# Patient Record
Sex: MALE | Race: WHITE | NOT HISPANIC OR LATINO | URBAN - METROPOLITAN AREA
[De-identification: names, ages, dates, MRNs, and addresses within clinical notes are randomized per-mention and may not be internally consistent; named-entity substitution may affect disease eponyms.]

---

## 2017-07-12 ENCOUNTER — INPATIENT (INPATIENT)
Facility: HOSPITAL | Age: 37
LOS: 1 days | Discharge: HOME | DRG: 730 | End: 2017-07-14
Attending: UROLOGY | Admitting: UROLOGY
Payer: SELF-PAY

## 2017-07-12 ENCOUNTER — EMERGENCY (EMERGENCY)
Facility: HOSPITAL | Age: 37
LOS: 1 days | Discharge: OTHER INSTITUTION | End: 2017-07-12
Attending: EMERGENCY MEDICINE | Admitting: EMERGENCY MEDICINE
Payer: SELF-PAY

## 2017-07-12 VITALS
DIASTOLIC BLOOD PRESSURE: 73 MMHG | RESPIRATION RATE: 18 BRPM | TEMPERATURE: 98 F | HEART RATE: 94 BPM | OXYGEN SATURATION: 99 % | SYSTOLIC BLOOD PRESSURE: 143 MMHG

## 2017-07-12 VITALS
HEART RATE: 72 BPM | RESPIRATION RATE: 16 BRPM | SYSTOLIC BLOOD PRESSURE: 124 MMHG | DIASTOLIC BLOOD PRESSURE: 80 MMHG | TEMPERATURE: 98 F | OXYGEN SATURATION: 94 %

## 2017-07-12 VITALS
SYSTOLIC BLOOD PRESSURE: 122 MMHG | DIASTOLIC BLOOD PRESSURE: 70 MMHG | RESPIRATION RATE: 19 BRPM | OXYGEN SATURATION: 99 % | HEART RATE: 79 BPM

## 2017-07-12 DIAGNOSIS — N48.33 PRIAPISM, DRUG-INDUCED: ICD-10-CM

## 2017-07-12 LAB
ALBUMIN SERPL ELPH-MCNC: 3.6 G/DL — SIGNIFICANT CHANGE UP (ref 3.4–5)
ALP SERPL-CCNC: 89 U/L — SIGNIFICANT CHANGE UP (ref 40–120)
ALT FLD-CCNC: 106 U/L — HIGH (ref 12–42)
ANION GAP SERPL CALC-SCNC: 10 MMOL/L — SIGNIFICANT CHANGE UP (ref 9–16)
APTT BLD: 31.1 SEC — SIGNIFICANT CHANGE UP (ref 27.5–36.5)
AST SERPL-CCNC: 70 U/L — HIGH (ref 15–37)
BASOPHILS NFR BLD AUTO: 0.3 % — SIGNIFICANT CHANGE UP (ref 0–2)
BILIRUB SERPL-MCNC: 1.3 MG/DL — HIGH (ref 0.2–1.2)
BUN SERPL-MCNC: 8 MG/DL — SIGNIFICANT CHANGE UP (ref 7–23)
CALCIUM SERPL-MCNC: 9.1 MG/DL — SIGNIFICANT CHANGE UP (ref 8.5–10.5)
CHLORIDE SERPL-SCNC: 102 MMOL/L — SIGNIFICANT CHANGE UP (ref 96–108)
CO2 SERPL-SCNC: 28 MMOL/L — SIGNIFICANT CHANGE UP (ref 22–31)
CREAT SERPL-MCNC: 0.95 MG/DL — SIGNIFICANT CHANGE UP (ref 0.5–1.3)
EOSINOPHIL NFR BLD AUTO: 3.3 % — SIGNIFICANT CHANGE UP (ref 0–6)
GLUCOSE SERPL-MCNC: 94 MG/DL — SIGNIFICANT CHANGE UP (ref 70–99)
HCT VFR BLD CALC: 38.2 % — LOW (ref 39–50)
HGB BLD-MCNC: 13.4 G/DL — SIGNIFICANT CHANGE UP (ref 13–17)
IMM GRANULOCYTES NFR BLD AUTO: 0.1 % — SIGNIFICANT CHANGE UP (ref 0–1.5)
INR BLD: 1.32 — HIGH (ref 0.88–1.16)
LYMPHOCYTES # BLD AUTO: 36.1 % — SIGNIFICANT CHANGE UP (ref 13–44)
MCHC RBC-ENTMCNC: 29.4 PG — SIGNIFICANT CHANGE UP (ref 27–34)
MCHC RBC-ENTMCNC: 35.1 G/DL — SIGNIFICANT CHANGE UP (ref 32–36)
MCV RBC AUTO: 83.8 FL — SIGNIFICANT CHANGE UP (ref 80–100)
MONOCYTES NFR BLD AUTO: 13 % — SIGNIFICANT CHANGE UP (ref 2–14)
NEUTROPHILS NFR BLD AUTO: 47.2 % — SIGNIFICANT CHANGE UP (ref 43–77)
PLATELET # BLD AUTO: 214 K/UL — SIGNIFICANT CHANGE UP (ref 150–400)
POTASSIUM SERPL-MCNC: 2.8 MMOL/L — CRITICAL LOW (ref 3.5–5.3)
POTASSIUM SERPL-SCNC: 2.8 MMOL/L — CRITICAL LOW (ref 3.5–5.3)
PROT SERPL-MCNC: 7.2 G/DL — SIGNIFICANT CHANGE UP (ref 6.4–8.2)
PROTHROM AB SERPL-ACNC: 14.6 SEC — HIGH (ref 9.8–12.7)
RBC # BLD: 4.56 M/UL — SIGNIFICANT CHANGE UP (ref 4.2–5.8)
RBC # FLD: 13.6 % — SIGNIFICANT CHANGE UP (ref 10.3–16.9)
SODIUM SERPL-SCNC: 140 MMOL/L — SIGNIFICANT CHANGE UP (ref 132–145)
WBC # BLD: 6.9 K/UL — SIGNIFICANT CHANGE UP (ref 3.8–10.5)
WBC # FLD AUTO: 6.9 K/UL — SIGNIFICANT CHANGE UP (ref 3.8–10.5)

## 2017-07-12 PROCEDURE — 99285 EMERGENCY DEPT VISIT HI MDM: CPT | Mod: 25

## 2017-07-12 PROCEDURE — 99285 EMERGENCY DEPT VISIT HI MDM: CPT

## 2017-07-12 RX ORDER — MORPHINE SULFATE 50 MG/1
4 CAPSULE, EXTENDED RELEASE ORAL ONCE
Qty: 0 | Refills: 0 | Status: DISCONTINUED | OUTPATIENT
Start: 2017-07-12 | End: 2017-07-12

## 2017-07-12 RX ORDER — SODIUM CHLORIDE 9 MG/ML
1000 INJECTION INTRAMUSCULAR; INTRAVENOUS; SUBCUTANEOUS ONCE
Qty: 0 | Refills: 0 | Status: COMPLETED | OUTPATIENT
Start: 2017-07-12 | End: 2017-07-12

## 2017-07-12 RX ORDER — POTASSIUM CHLORIDE 20 MEQ
40 PACKET (EA) ORAL ONCE
Qty: 0 | Refills: 0 | Status: COMPLETED | OUTPATIENT
Start: 2017-07-12 | End: 2017-07-12

## 2017-07-12 RX ADMIN — MORPHINE SULFATE 4 MILLIGRAM(S): 50 CAPSULE, EXTENDED RELEASE ORAL at 20:40

## 2017-07-12 RX ADMIN — MORPHINE SULFATE 4 MILLIGRAM(S): 50 CAPSULE, EXTENDED RELEASE ORAL at 20:10

## 2017-07-12 RX ADMIN — SODIUM CHLORIDE 1000 MILLILITER(S): 9 INJECTION INTRAMUSCULAR; INTRAVENOUS; SUBCUTANEOUS at 22:43

## 2017-07-12 RX ADMIN — Medication 40 MILLIEQUIVALENT(S): at 22:55

## 2017-07-12 RX ADMIN — MORPHINE SULFATE 4 MILLIGRAM(S): 50 CAPSULE, EXTENDED RELEASE ORAL at 22:44

## 2017-07-12 NOTE — ED PROVIDER NOTE - ATTENDING CONTRIBUTION TO CARE
36 yom pw priapism >24 hr (started last night 8pm), after injecting trimix.  also reported using crystal meth.  no other substances.  pt reported partial tumescence since but never full.     agree w/ PA, avss, tumescent penis noted, no bleeding, also small area of mildly erythematous and indurated area to R forearm, c/w injection, pt denies any broken needle, rec'd warm compress and topical abx and follow up, no lymphatic streaking, can consider oral abx if not improvement w/ aforementioned management

## 2017-07-12 NOTE — ED ADULT TRIAGE NOTE - CHIEF COMPLAINT QUOTE
priapism x 18 hrs. Pt is transfer from [facility]. Got a total of 8 mg of morphine

## 2017-07-12 NOTE — ED PROVIDER NOTE - OBJECTIVE STATEMENT
36 y.o. male with priapism approx 8-10 hours after using TRIMIX and IV/IM crystal meth, with c/o persistent priapism. Attempted drainage in ED but with no improvement, pt accepted to [facility] ED by DR CRISTINA, notified urology SCOTT MOON.

## 2017-07-12 NOTE — ED PROVIDER NOTE - MEDICAL DECISION MAKING DETAILS
appreciate [facility] chart and urology with patient upon arrival for care and treatment

## 2017-07-12 NOTE — ED PROVIDER NOTE - OBJECTIVE STATEMENT
xfer to [facility] from [facility] for urological care for persistent priapism medication induced and illicit drug related as well + priapism > 24 hours by this time

## 2017-07-12 NOTE — ED PROVIDER NOTE - CHIEF COMPLAINT
The patient is a 36y Male complaining of penile discharge. The patient is a 36y Male complaining of priapism

## 2017-07-12 NOTE — ED PROVIDER NOTE - MEDICAL DECISION MAKING DETAILS
unable to reduced priapism in ED, accepted for transfer to [facility] ED for urology eval

## 2017-07-13 DIAGNOSIS — N48.30 PRIAPISM, UNSPECIFIED: ICD-10-CM

## 2017-07-13 LAB
AMPHET UR-MCNC: POSITIVE
ANION GAP SERPL CALC-SCNC: 10 MMOL/L — SIGNIFICANT CHANGE UP (ref 5–17)
ANION GAP SERPL CALC-SCNC: 15 MMOL/L — SIGNIFICANT CHANGE UP (ref 5–17)
APPEARANCE UR: CLEAR — SIGNIFICANT CHANGE UP
BACTERIA # UR AUTO: PRESENT /HPF
BARBITURATES UR SCN-MCNC: NEGATIVE — SIGNIFICANT CHANGE UP
BASE EXCESS BLDA CALC-SCNC: -16 MMOL/L — LOW (ref -2–3)
BENZODIAZ UR-MCNC: POSITIVE
BILIRUB UR-MCNC: NEGATIVE — SIGNIFICANT CHANGE UP
BUN SERPL-MCNC: 6 MG/DL — LOW (ref 7–23)
BUN SERPL-MCNC: 6 MG/DL — LOW (ref 7–23)
CALCIUM SERPL-MCNC: 8.2 MG/DL — LOW (ref 8.4–10.5)
CALCIUM SERPL-MCNC: 8.4 MG/DL — SIGNIFICANT CHANGE UP (ref 8.4–10.5)
CHLORIDE SERPL-SCNC: 102 MMOL/L — SIGNIFICANT CHANGE UP (ref 96–108)
CHLORIDE SERPL-SCNC: 99 MMOL/L — SIGNIFICANT CHANGE UP (ref 96–108)
CO2 SERPL-SCNC: 26 MMOL/L — SIGNIFICANT CHANGE UP (ref 22–31)
CO2 SERPL-SCNC: 26 MMOL/L — SIGNIFICANT CHANGE UP (ref 22–31)
COCAINE METAB.OTHER UR-MCNC: NEGATIVE — SIGNIFICANT CHANGE UP
COLOR SPEC: YELLOW — SIGNIFICANT CHANGE UP
CREAT SERPL-MCNC: 0.7 MG/DL — SIGNIFICANT CHANGE UP (ref 0.5–1.3)
CREAT SERPL-MCNC: 0.9 MG/DL — SIGNIFICANT CHANGE UP (ref 0.5–1.3)
DIFF PNL FLD: (no result)
EPI CELLS # UR: SIGNIFICANT CHANGE UP /HPF
GAS PNL BLDA: SIGNIFICANT CHANGE UP
GLUCOSE SERPL-MCNC: 85 MG/DL — SIGNIFICANT CHANGE UP (ref 70–99)
GLUCOSE SERPL-MCNC: 95 MG/DL — SIGNIFICANT CHANGE UP (ref 70–99)
GLUCOSE UR QL: NEGATIVE — SIGNIFICANT CHANGE UP
HCO3 BLDA-SCNC: 20 MMOL/L — LOW (ref 21–28)
HCT VFR BLD CALC: 37.6 % — LOW (ref 39–50)
HGB BLD-MCNC: 12.6 G/DL — LOW (ref 13–17)
KETONES UR-MCNC: 40 MG/DL
LEUKOCYTE ESTERASE UR-ACNC: (no result)
MAGNESIUM SERPL-MCNC: 2.2 MG/DL — SIGNIFICANT CHANGE UP (ref 1.6–2.6)
MCHC RBC-ENTMCNC: 28.4 PG — SIGNIFICANT CHANGE UP (ref 27–34)
MCHC RBC-ENTMCNC: 33.5 G/DL — SIGNIFICANT CHANGE UP (ref 32–36)
MCV RBC AUTO: 84.9 FL — SIGNIFICANT CHANGE UP (ref 80–100)
METHADONE UR-MCNC: NEGATIVE — SIGNIFICANT CHANGE UP
NITRITE UR-MCNC: POSITIVE
OPIATES UR-MCNC: POSITIVE
PCO2 BLDA: 105 MMHG — CRITICAL HIGH (ref 35–48)
PCP SPEC-MCNC: SIGNIFICANT CHANGE UP
PCP UR-MCNC: NEGATIVE — SIGNIFICANT CHANGE UP
PH BLDA: 6.91 — CRITICAL LOW (ref 7.35–7.45)
PH UR: 6.5 — SIGNIFICANT CHANGE UP (ref 5–8)
PHOSPHATE SERPL-MCNC: 3.4 MG/DL — SIGNIFICANT CHANGE UP (ref 2.5–4.5)
PLATELET # BLD AUTO: 193 K/UL — SIGNIFICANT CHANGE UP (ref 150–400)
PO2 BLDA: 12 MMHG — CRITICAL LOW (ref 83–108)
POTASSIUM SERPL-MCNC: 3.1 MMOL/L — LOW (ref 3.5–5.3)
POTASSIUM SERPL-MCNC: 4.1 MMOL/L — SIGNIFICANT CHANGE UP (ref 3.5–5.3)
POTASSIUM SERPL-SCNC: 3.1 MMOL/L — LOW (ref 3.5–5.3)
POTASSIUM SERPL-SCNC: 4.1 MMOL/L — SIGNIFICANT CHANGE UP (ref 3.5–5.3)
PROT UR-MCNC: NEGATIVE MG/DL — SIGNIFICANT CHANGE UP
RBC # BLD: 4.43 M/UL — SIGNIFICANT CHANGE UP (ref 4.2–5.8)
RBC # FLD: 14.1 % — SIGNIFICANT CHANGE UP (ref 10.3–16.9)
RBC CASTS # UR COMP ASSIST: (no result) /HPF
SAO2 % BLDA: SIGNIFICANT CHANGE UP % (ref 95–100)
SODIUM SERPL-SCNC: 138 MMOL/L — SIGNIFICANT CHANGE UP (ref 135–145)
SODIUM SERPL-SCNC: 140 MMOL/L — SIGNIFICANT CHANGE UP (ref 135–145)
SP GR SPEC: 1.01 — SIGNIFICANT CHANGE UP (ref 1–1.03)
THC UR QL: NEGATIVE — SIGNIFICANT CHANGE UP
UROBILINOGEN FLD QL: 0.2 E.U./DL — SIGNIFICANT CHANGE UP
WBC # BLD: 6.9 K/UL — SIGNIFICANT CHANGE UP (ref 3.8–10.5)
WBC # FLD AUTO: 6.9 K/UL — SIGNIFICANT CHANGE UP (ref 3.8–10.5)
WBC UR QL: (no result) /HPF

## 2017-07-13 PROCEDURE — 54435 REVISION OF PENIS: CPT

## 2017-07-13 PROCEDURE — 99223 1ST HOSP IP/OBS HIGH 75: CPT | Mod: 57

## 2017-07-13 RX ORDER — MORPHINE SULFATE 50 MG/1
4 CAPSULE, EXTENDED RELEASE ORAL EVERY 4 HOURS
Qty: 0 | Refills: 0 | Status: DISCONTINUED | OUTPATIENT
Start: 2017-07-13 | End: 2017-07-14

## 2017-07-13 RX ORDER — OXYCODONE AND ACETAMINOPHEN 5; 325 MG/1; MG/1
2 TABLET ORAL EVERY 4 HOURS
Qty: 0 | Refills: 0 | Status: DISCONTINUED | OUTPATIENT
Start: 2017-07-13 | End: 2017-07-13

## 2017-07-13 RX ORDER — ONDANSETRON 8 MG/1
4 TABLET, FILM COATED ORAL EVERY 8 HOURS
Qty: 0 | Refills: 0 | Status: DISCONTINUED | OUTPATIENT
Start: 2017-07-13 | End: 2017-07-14

## 2017-07-13 RX ORDER — ACETAMINOPHEN 500 MG
650 TABLET ORAL EVERY 6 HOURS
Qty: 0 | Refills: 0 | Status: DISCONTINUED | OUTPATIENT
Start: 2017-07-13 | End: 2017-07-14

## 2017-07-13 RX ORDER — DOCUSATE SODIUM 100 MG
100 CAPSULE ORAL
Qty: 0 | Refills: 0 | Status: DISCONTINUED | OUTPATIENT
Start: 2017-07-13 | End: 2017-07-14

## 2017-07-13 RX ORDER — OXYCODONE AND ACETAMINOPHEN 5; 325 MG/1; MG/1
2 TABLET ORAL EVERY 4 HOURS
Qty: 0 | Refills: 0 | Status: DISCONTINUED | OUTPATIENT
Start: 2017-07-13 | End: 2017-07-14

## 2017-07-13 RX ORDER — ACETAMINOPHEN 500 MG
650 TABLET ORAL EVERY 6 HOURS
Qty: 0 | Refills: 0 | Status: DISCONTINUED | OUTPATIENT
Start: 2017-07-13 | End: 2017-07-13

## 2017-07-13 RX ORDER — ONDANSETRON 8 MG/1
4 TABLET, FILM COATED ORAL EVERY 8 HOURS
Qty: 0 | Refills: 0 | Status: DISCONTINUED | OUTPATIENT
Start: 2017-07-13 | End: 2017-07-13

## 2017-07-13 RX ORDER — CEFAZOLIN SODIUM 1 G
1000 VIAL (EA) INJECTION ONCE
Qty: 0 | Refills: 0 | Status: COMPLETED | OUTPATIENT
Start: 2017-07-13 | End: 2017-07-13

## 2017-07-13 RX ORDER — SODIUM CHLORIDE 9 MG/ML
1000 INJECTION INTRAMUSCULAR; INTRAVENOUS; SUBCUTANEOUS
Qty: 0 | Refills: 0 | Status: DISCONTINUED | OUTPATIENT
Start: 2017-07-13 | End: 2017-07-13

## 2017-07-13 RX ORDER — OXYCODONE AND ACETAMINOPHEN 5; 325 MG/1; MG/1
1 TABLET ORAL EVERY 4 HOURS
Qty: 0 | Refills: 0 | Status: DISCONTINUED | OUTPATIENT
Start: 2017-07-13 | End: 2017-07-14

## 2017-07-13 RX ORDER — SODIUM CHLORIDE 9 MG/ML
1000 INJECTION, SOLUTION INTRAVENOUS
Qty: 0 | Refills: 0 | Status: DISCONTINUED | OUTPATIENT
Start: 2017-07-13 | End: 2017-07-13

## 2017-07-13 RX ORDER — DOCUSATE SODIUM 100 MG
100 CAPSULE ORAL
Qty: 0 | Refills: 0 | Status: DISCONTINUED | OUTPATIENT
Start: 2017-07-13 | End: 2017-07-13

## 2017-07-13 RX ORDER — POTASSIUM CHLORIDE 20 MEQ
10 PACKET (EA) ORAL
Qty: 0 | Refills: 0 | Status: DISCONTINUED | OUTPATIENT
Start: 2017-07-13 | End: 2017-07-13

## 2017-07-13 RX ORDER — MORPHINE SULFATE 50 MG/1
4 CAPSULE, EXTENDED RELEASE ORAL EVERY 4 HOURS
Qty: 0 | Refills: 0 | Status: DISCONTINUED | OUTPATIENT
Start: 2017-07-13 | End: 2017-07-13

## 2017-07-13 RX ORDER — OXYCODONE AND ACETAMINOPHEN 5; 325 MG/1; MG/1
1 TABLET ORAL EVERY 4 HOURS
Qty: 0 | Refills: 0 | Status: DISCONTINUED | OUTPATIENT
Start: 2017-07-13 | End: 2017-07-13

## 2017-07-13 RX ORDER — CEFAZOLIN SODIUM 1 G
1000 VIAL (EA) INJECTION EVERY 8 HOURS
Qty: 0 | Refills: 0 | Status: DISCONTINUED | OUTPATIENT
Start: 2017-07-13 | End: 2017-07-13

## 2017-07-13 RX ORDER — SODIUM CHLORIDE 9 MG/ML
1000 INJECTION, SOLUTION INTRAVENOUS
Qty: 0 | Refills: 0 | Status: DISCONTINUED | OUTPATIENT
Start: 2017-07-13 | End: 2017-07-14

## 2017-07-13 RX ADMIN — OXYCODONE AND ACETAMINOPHEN 2 TABLET(S): 5; 325 TABLET ORAL at 20:44

## 2017-07-13 RX ADMIN — MORPHINE SULFATE 4 MILLIGRAM(S): 50 CAPSULE, EXTENDED RELEASE ORAL at 04:55

## 2017-07-13 RX ADMIN — OXYCODONE AND ACETAMINOPHEN 2 TABLET(S): 5; 325 TABLET ORAL at 03:43

## 2017-07-13 RX ADMIN — MORPHINE SULFATE 4 MILLIGRAM(S): 50 CAPSULE, EXTENDED RELEASE ORAL at 14:32

## 2017-07-13 RX ADMIN — Medication 100 MILLIEQUIVALENT(S): at 10:23

## 2017-07-13 RX ADMIN — Medication 1 TABLET(S): at 18:01

## 2017-07-13 RX ADMIN — Medication 100 MILLIGRAM(S): at 18:01

## 2017-07-13 RX ADMIN — OXYCODONE AND ACETAMINOPHEN 2 TABLET(S): 5; 325 TABLET ORAL at 04:43

## 2017-07-13 RX ADMIN — OXYCODONE AND ACETAMINOPHEN 2 TABLET(S): 5; 325 TABLET ORAL at 21:44

## 2017-07-13 RX ADMIN — MORPHINE SULFATE 4 MILLIGRAM(S): 50 CAPSULE, EXTENDED RELEASE ORAL at 14:17

## 2017-07-13 RX ADMIN — Medication 100 MILLIGRAM(S): at 02:05

## 2017-07-13 NOTE — PROGRESS NOTE ADULT - SUBJECTIVE AND OBJECTIVE BOX
Postop:  Pt denies chest pain, sob, nv or severe abdominal pain             Vital Signs Last 24 Hrs  T(C): 36.3 (13 Jul 2017 14:11), Max: 36.9 (12 Jul 2017 23:37)  T(F): 97.4 (13 Jul 2017 14:11), Max: 98.5 (13 Jul 2017 02:54)  HR: 61 (13 Jul 2017 14:11) (56 - 94)  BP: 134/83 (13 Jul 2017 14:11) (121/81 - 144/79)  BP(mean): --  RR: 16 (13 Jul 2017 14:11) (16 - 19)  SpO2: 98% (13 Jul 2017 14:11) (94% - 100%)    I&O's Summary    12 Jul 2017 07:01  -  13 Jul 2017 07:00  --------------------------------------------------------  IN: 300 mL / OUT: 0 mL / NET: 300 mL    13 Jul 2017 07:01  -  13 Jul 2017 14:59  --------------------------------------------------------  IN: 175 mL / OUT: 1600 mL / NET: -1425 mL        Gen: NAD    Abd: non-distended, TTP in suprapubic area    : jose taped up draining clear, blood at meatus with saturated dressing                           12.6   6.9   )-----------( 193      ( 13 Jul 2017 08:50 )             37.6     07-13    138  |  102  |  6<L>  ----------------------------<  85  3.1<L>   |  26  |  0.90    Ca    8.2<L>      13 Jul 2017 08:51  Phos  3.4     07-13  Mg     2.2     07-13    TPro  7.2  /  Alb  3.6  /  TBili  1.3<H>  /  DBili  x   /  AST  70<H>  /  ALT  106<H>  /  AlkPhos  89  07-12    cultures    A/P: 36 hx priapism s/p corpora cavernosa shunt 7/13  1- jose- monitor uop. Jose to remain for 6 weeks  2- bactrim  3- regular diet  4- pain meds prn  5- dc home tomorrow with bactrim for 2 weeks

## 2017-07-13 NOTE — H&P ADULT - NSHPLABSRESULTS_GEN_ALL_CORE
CBC Full  -  ( 12 Jul 2017 22:25 )  WBC Count : 6.9 K/uL  Hemoglobin : 13.4 g/dL  Hematocrit : 38.2 %  Platelet Count - Automated : 214 K/uL  Mean Cell Volume : 83.8 fL  Mean Cell Hemoglobin : 29.4 pg  Mean Cell Hemoglobin Concentration : 35.1 g/dL  Auto Neutrophil # : x  Auto Lymphocyte # : x  Auto Monocyte # : x  Auto Eosinophil # : x  Auto Basophil # : x  Auto Neutrophil % : 47.2 %  Auto Lymphocyte % : 36.1 %  Auto Monocyte % : 13.0 %  Auto Eosinophil % : 3.3 %  Auto Basophil % : 0.3 %  07-12    140  |  102  |  8   ----------------------------<  94  2.8<LL>   |  28  |  0.95    Ca    9.1      12 Jul 2017 22:25    TPro  7.2  /  Alb  3.6  /  TBili  1.3<H>  /  DBili  x   /  AST  70<H>  /  ALT  106<H>  /  AlkPhos  89  07-12

## 2017-07-13 NOTE — PROGRESS NOTE ADULT - SUBJECTIVE AND OBJECTIVE BOX
INTERVAL HPI/OVERNIGHT EVENTS:  admitted overnight for priapism. c/o discomfort penis.     VITALS:    T(F): 97.3 (07-13-17 @ 05:08), Max: 98.5 (07-13-17 @ 02:54)  HR: 74 (07-13-17 @ 05:08) (70 - 94)  BP: 141/69 (07-13-17 @ 05:08) (121/81 - 144/79)  RR: 17 (07-13-17 @ 05:08) (16 - 19)  SpO2: 95% (07-13-17 @ 05:08) (94% - 100%)  Wt(kg): --    I&O's Detail      MEDICATIONS:    ANTIBIOTICS:  ceFAZolin   IVPB 1000 milliGRAM(s) IV Intermittent every 8 hours      PAIN CONTROL:  ondansetron Injectable 4 milliGRAM(s) IV Push every 8 hours PRN  acetaminophen   Tablet. 650 milliGRAM(s) Oral every 6 hours PRN  oxyCODONE    5 mG/acetaminophen 325 mG 1 Tablet(s) Oral every 4 hours PRN  oxyCODONE    5 mG/acetaminophen 325 mG 2 Tablet(s) Oral every 4 hours PRN  morphine  - Injectable 4 milliGRAM(s) IV Push every 4 hours PRN       MEDS:      HEME/ONC        PHYSICAL EXAM:  General: No acute distress.  Alert and Oriented  Abdominal Exam: soft, NT, ND   Exam: +priapism      LABS:                        13.4   6.9   )-----------( 214      ( 12 Jul 2017 22:25 )             38.2     07-12    140  |  102  |  8   ----------------------------<  94  2.8<LL>   |  28  |  0.95    Ca    9.1      12 Jul 2017 22:25    TPro  7.2  /  Alb  3.6  /  TBili  1.3<H>  /  DBili  x   /  AST  70<H>  /  ALT  106<H>  /  AlkPhos  89  07-12    PT/INR - ( 12 Jul 2017 22:30 )   PT: 14.6 sec;   INR: 1.32          PTT - ( 12 Jul 2017 22:25 )  PTT:31.1 sec      RADIOLOGY & ADDITIONAL TESTS:

## 2017-07-13 NOTE — H&P ADULT - ATTENDING COMMENTS
seen and examined  extensive conversation with patient  pt reports erection for over 36 hours and unclear time of injection of trimix as medication was injected while high on crystal meth per his report. hx IV drug use. 10/10 erection was persistent but patient reports was unable to come in for care for a variety of reasons.  on exam  10/10 erection  erythema over right forearm which patient reports is due to IV drug injection    spoke with patient regarding prognosis given prolonged erection. failed intracavernous irrigation and phenylephrine injections. understands very high likelihood of permanent erectile dysfunction both as a result of priapism as well as possible cavernosal spongiosal shunt surgery. He understands permanent ED will likely mean failure of PDE5 inhibitors and intracavernous injection therapies, although he may be a candidate for future prosthesis surgery or JAM use. I am hesitant to consider up front implant surgery here due to unclear ability for follow up and unclear adherence, possibly leading to increased infection risk. Shunt risks including permanent ED, penile deformity and urethral injury requiring prolonged catheterization also discussed.     Patient is unsure as to how to best proceed and has become quite emotional over the prognosis from his prolonged priapism. I spent an additional 15 minutes addressing all of his questions but he remains unable to make a decision as to proceeding. He understands that he can choose to avoid surgery at this time and allow the priapism to "burn out" over the course of a few weeks, which will lead to near certain permanent ED.     He does not want to proceed at this time. Option for surgery remains for patient. Will follow up.

## 2017-07-13 NOTE — H&P ADULT - HISTORY OF PRESENT ILLNESS
37 yo male with h/o illicit drug use presented to [place] ER tonight c/o prolonged erection since last night 8pm. Patient was taking illicit drugs and injected trimix. Attempt in [place] ER to reduce priapism unsuccessful and pt transferred to [facility] ER.  Patient c/o discomfort penis. No CP/SOB. No Fever/chills. No penile d/c. Voiding ok.

## 2017-07-13 NOTE — ED ADULT NURSE NOTE - OBJECTIVE STATEMENT
Pt sent from [facility] for unresolved priapism > 18 hours despite draining.

## 2017-07-14 ENCOUNTER — TRANSCRIPTION ENCOUNTER (OUTPATIENT)
Age: 37
End: 2017-07-14

## 2017-07-14 VITALS
RESPIRATION RATE: 18 BRPM | TEMPERATURE: 97 F | HEART RATE: 90 BPM | DIASTOLIC BLOOD PRESSURE: 60 MMHG | OXYGEN SATURATION: 99 % | SYSTOLIC BLOOD PRESSURE: 105 MMHG

## 2017-07-14 LAB
ANION GAP SERPL CALC-SCNC: 12 MMOL/L — SIGNIFICANT CHANGE UP (ref 5–17)
BASOPHILS NFR BLD AUTO: 0.1 % — SIGNIFICANT CHANGE UP (ref 0–2)
BUN SERPL-MCNC: 9 MG/DL — SIGNIFICANT CHANGE UP (ref 7–23)
CALCIUM SERPL-MCNC: 8.5 MG/DL — SIGNIFICANT CHANGE UP (ref 8.4–10.5)
CHLORIDE SERPL-SCNC: 98 MMOL/L — SIGNIFICANT CHANGE UP (ref 96–108)
CO2 SERPL-SCNC: 27 MMOL/L — SIGNIFICANT CHANGE UP (ref 22–31)
CREAT SERPL-MCNC: 0.8 MG/DL — SIGNIFICANT CHANGE UP (ref 0.5–1.3)
EOSINOPHIL NFR BLD AUTO: 0.1 % — SIGNIFICANT CHANGE UP (ref 0–6)
GLUCOSE SERPL-MCNC: 104 MG/DL — HIGH (ref 70–99)
HCT VFR BLD CALC: 37.1 % — LOW (ref 39–50)
HGB BLD-MCNC: 12.8 G/DL — LOW (ref 13–17)
LYMPHOCYTES # BLD AUTO: 12.8 % — LOW (ref 13–44)
MCHC RBC-ENTMCNC: 29 PG — SIGNIFICANT CHANGE UP (ref 27–34)
MCHC RBC-ENTMCNC: 34.5 G/DL — SIGNIFICANT CHANGE UP (ref 32–36)
MCV RBC AUTO: 84.1 FL — SIGNIFICANT CHANGE UP (ref 80–100)
MONOCYTES NFR BLD AUTO: 12.5 % — SIGNIFICANT CHANGE UP (ref 2–14)
NEUTROPHILS NFR BLD AUTO: 74.5 % — SIGNIFICANT CHANGE UP (ref 43–77)
PLATELET # BLD AUTO: 205 K/UL — SIGNIFICANT CHANGE UP (ref 150–400)
POTASSIUM SERPL-MCNC: 4.3 MMOL/L — SIGNIFICANT CHANGE UP (ref 3.5–5.3)
POTASSIUM SERPL-SCNC: 4.3 MMOL/L — SIGNIFICANT CHANGE UP (ref 3.5–5.3)
RBC # BLD: 4.41 M/UL — SIGNIFICANT CHANGE UP (ref 4.2–5.8)
RBC # FLD: 14 % — SIGNIFICANT CHANGE UP (ref 10.3–16.9)
SODIUM SERPL-SCNC: 137 MMOL/L — SIGNIFICANT CHANGE UP (ref 135–145)
WBC # BLD: 12.3 K/UL — HIGH (ref 3.8–10.5)
WBC # FLD AUTO: 12.3 K/UL — HIGH (ref 3.8–10.5)

## 2017-07-14 PROCEDURE — 82803 BLOOD GASES ANY COMBINATION: CPT

## 2017-07-14 PROCEDURE — 85027 COMPLETE CBC AUTOMATED: CPT

## 2017-07-14 PROCEDURE — 85025 COMPLETE CBC W/AUTO DIFF WBC: CPT

## 2017-07-14 PROCEDURE — 81001 URINALYSIS AUTO W/SCOPE: CPT

## 2017-07-14 PROCEDURE — 99285 EMERGENCY DEPT VISIT HI MDM: CPT | Mod: 25

## 2017-07-14 PROCEDURE — 84100 ASSAY OF PHOSPHORUS: CPT

## 2017-07-14 PROCEDURE — 80307 DRUG TEST PRSMV CHEM ANLYZR: CPT

## 2017-07-14 PROCEDURE — 80048 BASIC METABOLIC PNL TOTAL CA: CPT

## 2017-07-14 PROCEDURE — 96374 THER/PROPH/DIAG INJ IV PUSH: CPT

## 2017-07-14 PROCEDURE — 36415 COLL VENOUS BLD VENIPUNCTURE: CPT

## 2017-07-14 PROCEDURE — 83735 ASSAY OF MAGNESIUM: CPT

## 2017-07-14 RX ORDER — AZTREONAM 2 G
1 VIAL (EA) INJECTION
Qty: 28 | Refills: 0 | OUTPATIENT
Start: 2017-07-14 | End: 2017-07-28

## 2017-07-14 RX ORDER — DOCUSATE SODIUM 100 MG
1 CAPSULE ORAL
Qty: 20 | Refills: 0 | OUTPATIENT
Start: 2017-07-14

## 2017-07-14 RX ORDER — DOCUSATE SODIUM 100 MG
1 CAPSULE ORAL
Qty: 30 | Refills: 0 | OUTPATIENT
Start: 2017-07-14

## 2017-07-14 RX ADMIN — OXYCODONE AND ACETAMINOPHEN 2 TABLET(S): 5; 325 TABLET ORAL at 10:04

## 2017-07-14 RX ADMIN — OXYCODONE AND ACETAMINOPHEN 2 TABLET(S): 5; 325 TABLET ORAL at 06:07

## 2017-07-14 RX ADMIN — Medication 1 TABLET(S): at 06:07

## 2017-07-14 RX ADMIN — OXYCODONE AND ACETAMINOPHEN 2 TABLET(S): 5; 325 TABLET ORAL at 10:34

## 2017-07-14 RX ADMIN — Medication 100 MILLIGRAM(S): at 06:07

## 2017-07-14 RX ADMIN — OXYCODONE AND ACETAMINOPHEN 2 TABLET(S): 5; 325 TABLET ORAL at 07:07

## 2017-07-14 NOTE — PROGRESS NOTE ADULT - SUBJECTIVE AND OBJECTIVE BOX
AM NOTE    Patient is a 36y old  Male who presents with a chief complaint of priapism (13 Jul 2017 02:15) s/p corpora cavernosa shunt 7/13      No acute events o/n      Vital Signs Last 24 Hrs  T(C): 36.9 (14 Jul 2017 00:23), Max: 36.9 (14 Jul 2017 00:23)  T(F): 98.5 (14 Jul 2017 00:23), Max: 98.5 (14 Jul 2017 00:23)  HR: 85 (14 Jul 2017 00:23) (56 - 91)  BP: 127/74 (14 Jul 2017 00:23) (121/74 - 141/69)  BP(mean): --  RR: 17 (14 Jul 2017 00:23) (16 - 18)  SpO2: 97% (14 Jul 2017 00:23) (95% - 100%)    I&O's Summary    12 Jul 2017 07:01  -  13 Jul 2017 07:00  --------------------------------------------------------  IN: 300 mL / OUT: 0 mL / NET: 300 mL    13 Jul 2017 07:01  -  14 Jul 2017 04:08  --------------------------------------------------------  IN: 175 mL / OUT: 3800 mL / NET: -3625 mL        Gen: NAD    Abd: appropriately TTP at SP, softly distended    :                           12.6   6.9   )-----------( 193      ( 13 Jul 2017 08:50 )             37.6     07-13    140  |  99  |  6<L>  ----------------------------<  95  4.1   |  26  |  0.70    Ca    8.4      13 Jul 2017 17:03  Phos  3.4     07-13  Mg     2.2     07-13    TPro  7.2  /  Alb  3.6  /  TBili  1.3<H>  /  DBili  x   /  AST  70<H>  /  ALT  106<H>  /  AlkPhos  89  07-12    cultures    A/P  36M s/p corpora cavernosal shunt 7/13  -cont abx  -diet: reg  -monitor UO, cont FC  -OOB/IS  -pain meds PRN  -dvt ppx

## 2017-07-14 NOTE — DISCHARGE NOTE ADULT - CARE PLAN
Principal Discharge DX:	Priapism  Goal:	ambulation, hydration, pain control  Instructions for follow-up, activity and diet:	Stay well hydrated, continue regular diet. No strenuous activity or heavy lifting. You may shower. Continue to wear the scrotal support garment. Continue to use ice packs. You are being discharged home with a jose catheter, please empty drainage bag as needed and monitor for signs of bleeding and infection. Please complete the full two weeks of your antibiotics. Your catheter will remain in place for approximately 6 weeks. Please do not take any aspirin or blood thinners until speaking with your surgeon. Please call your surgeon ([phone]) with fever >100.4, questions and to set up your follow up appointment. Principal Discharge DX:	Priapism  Goal:	ambulation, hydration, pain control  Instructions for follow-up, activity and diet:	Stay well hydrated, continue regular diet. No strenuous activity or heavy lifting. You may shower. Continue to wear the scrotal support garment. Continue to use ice packs. You are being discharged home with a jose catheter, please empty drainage bag as needed and monitor for signs of bleeding and infection. Please complete the full two weeks of your antibiotics. Your catheter will remain in place for approximately 6 weeks. Please do not take any aspirin or blood thinners until speaking with your surgeon. Please call your surgeon ([phone]) with fever >100.4, questions and to set up your follow up appointment.

## 2017-07-14 NOTE — DISCHARGE NOTE ADULT - PLAN OF CARE
ambulation, hydration, pain control Stay well hydrated, continue regular diet. No strenuous activity or heavy lifting. You may shower. Continue to wear the scrotal support garment. Continue to use ice packs. You are being discharged home with a jose catheter, please empty drainage bag as needed and monitor for signs of bleeding and infection. Please complete the full two weeks of your antibiotics. Your catheter will remain in place for approximately 6 weeks. Please do not take any aspirin or blood thinners until speaking with your surgeon. Please call your surgeon ([phone]) with fever >100.4, questions and to set up your follow up appointment.

## 2017-07-14 NOTE — DISCHARGE NOTE ADULT - HOSPITAL COURSE
patient admitted with priapism x 27 hours underwent corpora cavernosa shunt, tolerated procedure well, VSS, afebrile, ambulatory and hemodynamically stable.

## 2017-07-14 NOTE — DISCHARGE NOTE ADULT - PATIENT PORTAL LINK FT
“You can access the [employer] Patient Portal, offered by [facility], by registering with the following website: [email or web address]”

## 2017-07-15 ENCOUNTER — INPATIENT (INPATIENT)
Facility: HOSPITAL | Age: 37
LOS: 14 days | Discharge: HOME | DRG: 919 | End: 2017-07-30
Attending: UROLOGY | Admitting: UROLOGY
Payer: SELF-PAY

## 2017-07-15 VITALS
RESPIRATION RATE: 18 BRPM | DIASTOLIC BLOOD PRESSURE: 57 MMHG | WEIGHT: 179.9 LBS | SYSTOLIC BLOOD PRESSURE: 94 MMHG | HEART RATE: 140 BPM | HEIGHT: 75 IN | OXYGEN SATURATION: 97 % | TEMPERATURE: 100 F

## 2017-07-15 DIAGNOSIS — N48.89 OTHER SPECIFIED DISORDERS OF PENIS: ICD-10-CM

## 2017-07-15 LAB
ALBUMIN SERPL ELPH-MCNC: 4.2 G/DL — SIGNIFICANT CHANGE UP (ref 3.3–5)
ALP SERPL-CCNC: 156 U/L — HIGH (ref 40–120)
ALT FLD-CCNC: 82 U/L — HIGH (ref 10–45)
ANION GAP SERPL CALC-SCNC: 15 MMOL/L — SIGNIFICANT CHANGE UP (ref 5–17)
APPEARANCE UR: CLEAR — SIGNIFICANT CHANGE UP
APTT BLD: 25.2 SEC — LOW (ref 27.5–37.4)
AST SERPL-CCNC: 75 U/L — HIGH (ref 10–40)
BACTERIA # UR AUTO: (no result) /HPF
BASE EXCESS BLDV CALC-SCNC: 3.5 MMOL/L — SIGNIFICANT CHANGE UP
BASOPHILS NFR BLD AUTO: 0.1 % — SIGNIFICANT CHANGE UP (ref 0–2)
BILIRUB SERPL-MCNC: 1.5 MG/DL — HIGH (ref 0.2–1.2)
BILIRUB UR-MCNC: NEGATIVE — SIGNIFICANT CHANGE UP
BUN SERPL-MCNC: 10 MG/DL — SIGNIFICANT CHANGE UP (ref 7–23)
CALCIUM SERPL-MCNC: 9.2 MG/DL — SIGNIFICANT CHANGE UP (ref 8.4–10.5)
CHLORIDE SERPL-SCNC: 91 MMOL/L — LOW (ref 96–108)
CO2 SERPL-SCNC: 27 MMOL/L — SIGNIFICANT CHANGE UP (ref 22–31)
COLOR SPEC: YELLOW — SIGNIFICANT CHANGE UP
CREAT SERPL-MCNC: 1 MG/DL — SIGNIFICANT CHANGE UP (ref 0.5–1.3)
DIFF PNL FLD: NEGATIVE — SIGNIFICANT CHANGE UP
EOSINOPHIL NFR BLD AUTO: 0.7 % — SIGNIFICANT CHANGE UP (ref 0–6)
GAS PNL BLDV: SIGNIFICANT CHANGE UP
GLUCOSE SERPL-MCNC: 108 MG/DL — HIGH (ref 70–99)
GLUCOSE UR QL: NEGATIVE — SIGNIFICANT CHANGE UP
HCO3 BLDV-SCNC: 27 MMOL/L — SIGNIFICANT CHANGE UP (ref 20–27)
HCT VFR BLD CALC: 35.5 % — LOW (ref 39–50)
HCT VFR BLD CALC: 42.9 % — SIGNIFICANT CHANGE UP (ref 39–50)
HGB BLD-MCNC: 11.9 G/DL — LOW (ref 13–17)
HGB BLD-MCNC: 14.5 G/DL — SIGNIFICANT CHANGE UP (ref 13–17)
HIV 1+2 AB+HIV1 P24 AG SERPL QL IA: SIGNIFICANT CHANGE UP
INR BLD: 1.33 — HIGH (ref 0.88–1.16)
KETONES UR-MCNC: NEGATIVE — SIGNIFICANT CHANGE UP
LACTATE SERPL-SCNC: 1.5 MMOL/L — SIGNIFICANT CHANGE UP (ref 0.5–2)
LACTATE SERPL-SCNC: 2.7 MMOL/L — HIGH (ref 0.5–2)
LEUKOCYTE ESTERASE UR-ACNC: (no result)
LYMPHOCYTES # BLD AUTO: 4.3 % — LOW (ref 13–44)
MCHC RBC-ENTMCNC: 28.8 PG — SIGNIFICANT CHANGE UP (ref 27–34)
MCHC RBC-ENTMCNC: 28.9 PG — SIGNIFICANT CHANGE UP (ref 27–34)
MCHC RBC-ENTMCNC: 33.5 G/DL — SIGNIFICANT CHANGE UP (ref 32–36)
MCHC RBC-ENTMCNC: 33.8 G/DL — SIGNIFICANT CHANGE UP (ref 32–36)
MCV RBC AUTO: 85.5 FL — SIGNIFICANT CHANGE UP (ref 80–100)
MCV RBC AUTO: 86 FL — SIGNIFICANT CHANGE UP (ref 80–100)
MONOCYTES NFR BLD AUTO: 2.2 % — SIGNIFICANT CHANGE UP (ref 2–14)
NEUTROPHILS NFR BLD AUTO: 92.7 % — HIGH (ref 43–77)
NITRITE UR-MCNC: NEGATIVE — SIGNIFICANT CHANGE UP
PCO2 BLDV: 39 MMHG — LOW (ref 41–51)
PCP SPEC-MCNC: SIGNIFICANT CHANGE UP
PH BLDV: 7.47 — HIGH (ref 7.32–7.43)
PH UR: 5.5 — SIGNIFICANT CHANGE UP (ref 5–8)
PLATELET # BLD AUTO: 134 K/UL — LOW (ref 150–400)
PLATELET # BLD AUTO: 186 K/UL — SIGNIFICANT CHANGE UP (ref 150–400)
PO2 BLDV: 21 MMHG — SIGNIFICANT CHANGE UP
POTASSIUM SERPL-MCNC: 3.6 MMOL/L — SIGNIFICANT CHANGE UP (ref 3.5–5.3)
POTASSIUM SERPL-SCNC: 3.6 MMOL/L — SIGNIFICANT CHANGE UP (ref 3.5–5.3)
PROT SERPL-MCNC: 8 G/DL — SIGNIFICANT CHANGE UP (ref 6–8.3)
PROT UR-MCNC: NEGATIVE MG/DL — SIGNIFICANT CHANGE UP
PROTHROM AB SERPL-ACNC: 14.9 SEC — HIGH (ref 9.8–12.7)
RBC # BLD: 4.13 M/UL — LOW (ref 4.2–5.8)
RBC # BLD: 5.02 M/UL — SIGNIFICANT CHANGE UP (ref 4.2–5.8)
RBC # FLD: 14.3 % — SIGNIFICANT CHANGE UP (ref 10.3–16.9)
RBC # FLD: 14.4 % — SIGNIFICANT CHANGE UP (ref 10.3–16.9)
RBC CASTS # UR COMP ASSIST: < 5 /HPF — SIGNIFICANT CHANGE UP
SAO2 % BLDV: 36 % — SIGNIFICANT CHANGE UP
SODIUM SERPL-SCNC: 133 MMOL/L — LOW (ref 135–145)
SP GR SPEC: <=1.005 — SIGNIFICANT CHANGE UP (ref 1–1.03)
UROBILINOGEN FLD QL: 0.2 E.U./DL — SIGNIFICANT CHANGE UP
WBC # BLD: 8.8 K/UL — SIGNIFICANT CHANGE UP (ref 3.8–10.5)
WBC # BLD: 9.4 K/UL — SIGNIFICANT CHANGE UP (ref 3.8–10.5)
WBC # FLD AUTO: 8.8 K/UL — SIGNIFICANT CHANGE UP (ref 3.8–10.5)
WBC # FLD AUTO: 9.4 K/UL — SIGNIFICANT CHANGE UP (ref 3.8–10.5)
WBC UR QL: > 10 /HPF

## 2017-07-15 PROCEDURE — 93010 ELECTROCARDIOGRAM REPORT: CPT

## 2017-07-15 PROCEDURE — 99291 CRITICAL CARE FIRST HOUR: CPT | Mod: 25

## 2017-07-15 RX ORDER — SODIUM CHLORIDE 9 MG/ML
500 INJECTION INTRAMUSCULAR; INTRAVENOUS; SUBCUTANEOUS ONCE
Qty: 0 | Refills: 0 | Status: COMPLETED | OUTPATIENT
Start: 2017-07-15 | End: 2017-07-15

## 2017-07-15 RX ORDER — SODIUM CHLORIDE 9 MG/ML
1000 INJECTION INTRAMUSCULAR; INTRAVENOUS; SUBCUTANEOUS
Qty: 0 | Refills: 0 | Status: DISCONTINUED | OUTPATIENT
Start: 2017-07-15 | End: 2017-07-16

## 2017-07-15 RX ORDER — ACETAMINOPHEN 500 MG
650 TABLET ORAL EVERY 6 HOURS
Qty: 0 | Refills: 0 | Status: DISCONTINUED | OUTPATIENT
Start: 2017-07-15 | End: 2017-07-30

## 2017-07-15 RX ORDER — PIPERACILLIN AND TAZOBACTAM 4; .5 G/20ML; G/20ML
3.38 INJECTION, POWDER, LYOPHILIZED, FOR SOLUTION INTRAVENOUS ONCE
Qty: 0 | Refills: 0 | Status: COMPLETED | OUTPATIENT
Start: 2017-07-15 | End: 2017-07-15

## 2017-07-15 RX ORDER — SENNA PLUS 8.6 MG/1
2 TABLET ORAL AT BEDTIME
Qty: 0 | Refills: 0 | Status: DISCONTINUED | OUTPATIENT
Start: 2017-07-15 | End: 2017-07-20

## 2017-07-15 RX ORDER — VANCOMYCIN HCL 1 G
1000 VIAL (EA) INTRAVENOUS ONCE
Qty: 0 | Refills: 0 | Status: COMPLETED | OUTPATIENT
Start: 2017-07-15 | End: 2017-07-15

## 2017-07-15 RX ORDER — OXYCODONE AND ACETAMINOPHEN 5; 325 MG/1; MG/1
1 TABLET ORAL EVERY 4 HOURS
Qty: 0 | Refills: 0 | Status: DISCONTINUED | OUTPATIENT
Start: 2017-07-15 | End: 2017-07-22

## 2017-07-15 RX ORDER — POTASSIUM CHLORIDE 20 MEQ
40 PACKET (EA) ORAL ONCE
Qty: 0 | Refills: 0 | Status: COMPLETED | OUTPATIENT
Start: 2017-07-15 | End: 2017-07-15

## 2017-07-15 RX ORDER — VANCOMYCIN HCL 1 G
VIAL (EA) INTRAVENOUS
Qty: 0 | Refills: 0 | Status: DISCONTINUED | OUTPATIENT
Start: 2017-07-15 | End: 2017-07-16

## 2017-07-15 RX ORDER — SODIUM CHLORIDE 9 MG/ML
1000 INJECTION INTRAMUSCULAR; INTRAVENOUS; SUBCUTANEOUS ONCE
Qty: 0 | Refills: 0 | Status: COMPLETED | OUTPATIENT
Start: 2017-07-15 | End: 2017-07-15

## 2017-07-15 RX ORDER — MORPHINE SULFATE 50 MG/1
4 CAPSULE, EXTENDED RELEASE ORAL EVERY 6 HOURS
Qty: 0 | Refills: 0 | Status: DISCONTINUED | OUTPATIENT
Start: 2017-07-15 | End: 2017-07-21

## 2017-07-15 RX ORDER — PIPERACILLIN AND TAZOBACTAM 4; .5 G/20ML; G/20ML
3.38 INJECTION, POWDER, LYOPHILIZED, FOR SOLUTION INTRAVENOUS EVERY 6 HOURS
Qty: 0 | Refills: 0 | Status: DISCONTINUED | OUTPATIENT
Start: 2017-07-15 | End: 2017-07-17

## 2017-07-15 RX ORDER — SODIUM CHLORIDE 9 MG/ML
1000 INJECTION INTRAMUSCULAR; INTRAVENOUS; SUBCUTANEOUS
Qty: 0 | Refills: 0 | Status: DISCONTINUED | OUTPATIENT
Start: 2017-07-15 | End: 2017-07-15

## 2017-07-15 RX ORDER — MORPHINE SULFATE 50 MG/1
4 CAPSULE, EXTENDED RELEASE ORAL ONCE
Qty: 0 | Refills: 0 | Status: DISCONTINUED | OUTPATIENT
Start: 2017-07-15 | End: 2017-07-15

## 2017-07-15 RX ORDER — ONDANSETRON 8 MG/1
4 TABLET, FILM COATED ORAL EVERY 6 HOURS
Qty: 0 | Refills: 0 | Status: DISCONTINUED | OUTPATIENT
Start: 2017-07-15 | End: 2017-07-30

## 2017-07-15 RX ORDER — DOCUSATE SODIUM 100 MG
100 CAPSULE ORAL
Qty: 0 | Refills: 0 | Status: DISCONTINUED | OUTPATIENT
Start: 2017-07-15 | End: 2017-07-20

## 2017-07-15 RX ORDER — VANCOMYCIN HCL 1 G
1000 VIAL (EA) INTRAVENOUS EVERY 12 HOURS
Qty: 0 | Refills: 0 | Status: DISCONTINUED | OUTPATIENT
Start: 2017-07-16 | End: 2017-07-16

## 2017-07-15 RX ADMIN — MORPHINE SULFATE 4 MILLIGRAM(S): 50 CAPSULE, EXTENDED RELEASE ORAL at 16:08

## 2017-07-15 RX ADMIN — Medication 100 MILLIGRAM(S): at 21:45

## 2017-07-15 RX ADMIN — Medication 40 MILLIEQUIVALENT(S): at 21:45

## 2017-07-15 RX ADMIN — PIPERACILLIN AND TAZOBACTAM 200 GRAM(S): 4; .5 INJECTION, POWDER, LYOPHILIZED, FOR SOLUTION INTRAVENOUS at 23:38

## 2017-07-15 RX ADMIN — Medication 650 MILLIGRAM(S): at 19:39

## 2017-07-15 RX ADMIN — PIPERACILLIN AND TAZOBACTAM 200 GRAM(S): 4; .5 INJECTION, POWDER, LYOPHILIZED, FOR SOLUTION INTRAVENOUS at 16:13

## 2017-07-15 RX ADMIN — SODIUM CHLORIDE 1000 MILLILITER(S): 9 INJECTION INTRAMUSCULAR; INTRAVENOUS; SUBCUTANEOUS at 16:13

## 2017-07-15 RX ADMIN — SODIUM CHLORIDE 500 MILLILITER(S): 9 INJECTION INTRAMUSCULAR; INTRAVENOUS; SUBCUTANEOUS at 16:54

## 2017-07-15 RX ADMIN — SODIUM CHLORIDE 1000 MILLILITER(S): 9 INJECTION INTRAMUSCULAR; INTRAVENOUS; SUBCUTANEOUS at 16:12

## 2017-07-15 RX ADMIN — MORPHINE SULFATE 4 MILLIGRAM(S): 50 CAPSULE, EXTENDED RELEASE ORAL at 16:04

## 2017-07-15 RX ADMIN — SODIUM CHLORIDE 125 MILLILITER(S): 9 INJECTION INTRAMUSCULAR; INTRAVENOUS; SUBCUTANEOUS at 16:53

## 2017-07-15 RX ADMIN — Medication 250 MILLIGRAM(S): at 21:45

## 2017-07-15 NOTE — ED PROVIDER NOTE - PROGRESS NOTE DETAILS
Urology consult requested for evaluation. Labs reviewed w/ findings of normal wbc, mildly elev lactate, mild dehydration. + elev lft's (also noted on prior admission- pt denying any abd pain, with no abd tenderness). Pt feeling improved with ivf hydration. Repeat lactate normal. Pt seen by urology; will admit to their svc for continued iv hydration.

## 2017-07-15 NOTE — ED ADULT NURSE REASSESSMENT NOTE - NS ED NURSE REASSESS COMMENT FT1
pt with temp = 102. tylenol given per orders. report given to jeni Patel.
Patient a/oX 3, currently states pain on penile area decreased and tolerable s/p Morphine 4mg IVP.  Vital signs stable, HR improved, NSR on cardiac monitor s/p Sepsis protocol activation.  PIV #18 X 2 inserted to left and right AC, all labs, blood cultures drawn.  Zosyn 3.75gm IVPB adminsitered, no adverse rxn.  NSS bolus 2.5L completed, tolerated well, repeat lactate sent after IVF bolus.  NSS ongoing 125ml/hr.  Jose catheter in place on arrival to ED, connected to leg bag, patent, draining dark yellow urine, dried blood noted on meatus.  Jose leg bag emptied and dependent jose drainage bag applied aseptically. I and O monitoring ongoing.  For admit.  Urology consult pending.  Patient stable and comfortable at this time.  Will continue to monitor.

## 2017-07-15 NOTE — H&P ADULT - HISTORY OF PRESENT ILLNESS
36M history illicit drug use, was admitted last week for priapism for over 24 hours. Pt underwent a corpora cavernosa shunt 7/13 and was discharged yesterday. Pt reports he went to his friends house, who he thought would give him money for his prescription. Pt reports he didn't fill the rx for abx since he couldn't afford it. He reports not eating anything and just had some iced tea. Today he reports increased pain with the penis, feeling feverish and chills. Denies other complaints. Condon catheter draining clear.

## 2017-07-15 NOTE — ED PROVIDER NOTE - OBJECTIVE STATEMENT
Pt is a 35yo m, recently admitted on 7/13/17 for priapism after trimix use, s/p corpora cavernosa shunt and dc'd yesterday w/ indwelling jose catheter, who p/w worsening penile pain and blood at meatus around jose catheter, no allev factors. States jose is not draining much urine. + chills. No n/v, abd pain, flank pain, hematuria. Was dc'd w/ rx for abx however has not filled it due to financial issues.

## 2017-07-15 NOTE — ED ADULT NURSE REASSESSMENT NOTE - COMFORT CARE
warm blanket provided/darkened lights/wait time explained/plan of care explained/side rails up/repositioned

## 2017-07-15 NOTE — ED ADULT NURSE NOTE - OBJECTIVE STATEMENT
Patient c/o penile/groin pain 10/10 on arrival to ED w/ episodes of chills and fever.  Condon catheter in place on arrival to ED connected to leg bag draining dark yellow urine and dried blood at meatus .  Tacchycardia noted on arrival to ED.

## 2017-07-15 NOTE — H&P ADULT - PROBLEM SELECTOR PLAN 1
1. admit to gu  2. regular diet  3- bactrim for infection ppx  4- IVF @100/h  5- pain meds prn  6- fu 8pm labs  7- fu Ucx, Bcx

## 2017-07-15 NOTE — ED PROVIDER NOTE - MEDICAL DECISION MAKING DETAILS
Impression: worsening penile pain s/p recent corpora cavernosa shunt for priapism. Tachycardic and hypotensive on arrival concerning for possible severe sepsis. Pt is afebrile, however took advil few hrs pta. Sepsis bundle initiated and pt ordered for ivf boluses and zosyn. Will call for urology consult. Will continue to monitor. Impression: worsening penile pain s/p recent corpora cavernosa shunt for priapism. Tachycardic and hypotensive on arrival concerning for possible severe sepsis +/- dehydration. Pt is afebrile, however took advil few hrs pta. Sepsis bundle initiated and pt ordered for ivf boluses and zosyn. Will call for urology consult. Will continue to monitor.

## 2017-07-15 NOTE — ED PROVIDER NOTE - CRITICAL CARE PROVIDED
interpretation of diagnostic studies/consultation with other physicians/direct patient care (not related to procedure)/additional history taking/documentation

## 2017-07-15 NOTE — H&P ADULT - NSHPPHYSICALEXAM_GEN_ALL_CORE
Gen: in distress due to pain  Abd: NTND  : jose taped up draining clear, penis erect, bruising along shaft, moderately TTP, small amount of blood on the dressing at the meatus, no pus, scrotum non-tender, no lesions appreciated

## 2017-07-15 NOTE — H&P ADULT - ATTENDING COMMENTS
discharged with persistent priapism despite T shunt and snake maneuver  multiple postop conversations with patient discussing role of further proximal shunt maneuvers. in general failure of snake maneuver makes it highly unlikely additional shunting maneuvers will successfully detumesce penis. patient opted to allow priapism to "burn out" after our consultation.    now returns with fevers. did not fill abx prescription due to cost considerations.    admitted and cultures sent.    ESBL ecoli in blood and urine.  for PICC line  ID following.

## 2017-07-15 NOTE — ED PROVIDER NOTE - PHYSICAL EXAMINATION
VITAL SIGNS: I have reviewed nursing notes and confirm.  CONSTITUTIONAL: Well-developed; well-nourished; in mild distress, tearful.   SKIN:  warm and dry, no acute rash.   HEAD:  normocephalic, atraumatic.  EYES: PERRL, EOM intact; conjunctiva and sclera clear.  ENT: No nasal discharge; airway clear. O/P clear, no exudates, enlarged tonsils.  NECK: Supple.  CARD: S1, S2 normal; no murmurs, gallops, or rubs. Regular rate and rhythm.   RESP:  Clear to auscultation b/l, no wheezes, rales or rhonchi.  ABD: Normal bowel sounds; soft; non-distended; non-tender; no guarding/ rebound. No bladder distention.  : + circumcised penis with mild , + indwelling jose catheter w/ dried blood at meatus  EXT: Normal ROM. No clubbing, cyanosis or edema. 2+ pulses to b/l ue/le.  NEURO: Alert, oriented, grossly unremarkable  PSYCH: + emotionally labile and tearful

## 2017-07-15 NOTE — H&P ADULT - ASSESSMENT
36M s/p corpora cavernosa shunt 7/13 for priapism back with penile pain, subjective fevers and likely dehydration 2/2 poor po intake

## 2017-07-16 LAB
ANION GAP SERPL CALC-SCNC: 11 MMOL/L — SIGNIFICANT CHANGE UP (ref 5–17)
ANION GAP SERPL CALC-SCNC: 15 MMOL/L — SIGNIFICANT CHANGE UP (ref 5–17)
APPEARANCE UR: CLEAR — SIGNIFICANT CHANGE UP
BILIRUB UR-MCNC: (no result)
BUN SERPL-MCNC: 10 MG/DL — SIGNIFICANT CHANGE UP (ref 7–23)
BUN SERPL-MCNC: 10 MG/DL — SIGNIFICANT CHANGE UP (ref 7–23)
CALCIUM SERPL-MCNC: 7.7 MG/DL — LOW (ref 8.4–10.5)
CALCIUM SERPL-MCNC: 8 MG/DL — LOW (ref 8.4–10.5)
CHLORIDE SERPL-SCNC: 100 MMOL/L — SIGNIFICANT CHANGE UP (ref 96–108)
CHLORIDE SERPL-SCNC: 97 MMOL/L — SIGNIFICANT CHANGE UP (ref 96–108)
CO2 SERPL-SCNC: 23 MMOL/L — SIGNIFICANT CHANGE UP (ref 22–31)
CO2 SERPL-SCNC: 24 MMOL/L — SIGNIFICANT CHANGE UP (ref 22–31)
COLOR SPEC: YELLOW — SIGNIFICANT CHANGE UP
CREAT SERPL-MCNC: 0.8 MG/DL — SIGNIFICANT CHANGE UP (ref 0.5–1.3)
CREAT SERPL-MCNC: 0.9 MG/DL — SIGNIFICANT CHANGE UP (ref 0.5–1.3)
DIFF PNL FLD: (no result)
E COLI DNA BLD POS QL NAA+NON-PROBE: SIGNIFICANT CHANGE UP
GLUCOSE SERPL-MCNC: 114 MG/DL — HIGH (ref 70–99)
GLUCOSE SERPL-MCNC: 158 MG/DL — HIGH (ref 70–99)
GLUCOSE UR QL: NEGATIVE — SIGNIFICANT CHANGE UP
GRAM STN FLD: SIGNIFICANT CHANGE UP
HCT VFR BLD CALC: 33.7 % — LOW (ref 39–50)
HGB BLD-MCNC: 11.4 G/DL — LOW (ref 13–17)
KETONES UR-MCNC: NEGATIVE — SIGNIFICANT CHANGE UP
LEUKOCYTE ESTERASE UR-ACNC: NEGATIVE — SIGNIFICANT CHANGE UP
MAGNESIUM SERPL-MCNC: 1.3 MG/DL — LOW (ref 1.6–2.6)
MAGNESIUM SERPL-MCNC: 1.3 MG/DL — LOW (ref 1.6–2.6)
MCHC RBC-ENTMCNC: 29.1 PG — SIGNIFICANT CHANGE UP (ref 27–34)
MCHC RBC-ENTMCNC: 33.8 G/DL — SIGNIFICANT CHANGE UP (ref 32–36)
MCV RBC AUTO: 86 FL — SIGNIFICANT CHANGE UP (ref 80–100)
METHOD TYPE: SIGNIFICANT CHANGE UP
NITRITE UR-MCNC: NEGATIVE — SIGNIFICANT CHANGE UP
PH UR: 6.5 — SIGNIFICANT CHANGE UP (ref 5–8)
PHOSPHATE SERPL-MCNC: 1.1 MG/DL — LOW (ref 2.5–4.5)
PHOSPHATE SERPL-MCNC: 2.2 MG/DL — LOW (ref 2.5–4.5)
PLATELET # BLD AUTO: 119 K/UL — LOW (ref 150–400)
POTASSIUM SERPL-MCNC: 3.5 MMOL/L — SIGNIFICANT CHANGE UP (ref 3.5–5.3)
POTASSIUM SERPL-MCNC: 4.4 MMOL/L — SIGNIFICANT CHANGE UP (ref 3.5–5.3)
POTASSIUM SERPL-SCNC: 3.5 MMOL/L — SIGNIFICANT CHANGE UP (ref 3.5–5.3)
POTASSIUM SERPL-SCNC: 4.4 MMOL/L — SIGNIFICANT CHANGE UP (ref 3.5–5.3)
PROT UR-MCNC: 30 MG/DL
RBC # BLD: 3.92 M/UL — LOW (ref 4.2–5.8)
RBC # FLD: 14.7 % — SIGNIFICANT CHANGE UP (ref 10.3–16.9)
SODIUM SERPL-SCNC: 135 MMOL/L — SIGNIFICANT CHANGE UP (ref 135–145)
SODIUM SERPL-SCNC: 135 MMOL/L — SIGNIFICANT CHANGE UP (ref 135–145)
SP GR SPEC: 1.01 — SIGNIFICANT CHANGE UP (ref 1–1.03)
UROBILINOGEN FLD QL: >=8 E.U./DL
WBC # BLD: 10.6 K/UL — HIGH (ref 3.8–10.5)
WBC # FLD AUTO: 10.6 K/UL — HIGH (ref 3.8–10.5)

## 2017-07-16 PROCEDURE — 71010: CPT | Mod: 26

## 2017-07-16 RX ORDER — MAGNESIUM SULFATE 500 MG/ML
1 VIAL (ML) INJECTION
Qty: 0 | Refills: 0 | Status: COMPLETED | OUTPATIENT
Start: 2017-07-16 | End: 2017-07-16

## 2017-07-16 RX ORDER — VANCOMYCIN HCL 1 G
1000 VIAL (EA) INTRAVENOUS EVERY 12 HOURS
Qty: 0 | Refills: 0 | Status: DISCONTINUED | OUTPATIENT
Start: 2017-07-16 | End: 2017-07-17

## 2017-07-16 RX ORDER — IBUPROFEN 200 MG
400 TABLET ORAL EVERY 6 HOURS
Qty: 0 | Refills: 0 | Status: DISCONTINUED | OUTPATIENT
Start: 2017-07-16 | End: 2017-07-30

## 2017-07-16 RX ADMIN — Medication 100 GRAM(S): at 08:56

## 2017-07-16 RX ADMIN — Medication 400 MILLIGRAM(S): at 14:10

## 2017-07-16 RX ADMIN — PIPERACILLIN AND TAZOBACTAM 200 GRAM(S): 4; .5 INJECTION, POWDER, LYOPHILIZED, FOR SOLUTION INTRAVENOUS at 11:44

## 2017-07-16 RX ADMIN — Medication 63.75 MILLIMOLE(S): at 10:26

## 2017-07-16 RX ADMIN — PIPERACILLIN AND TAZOBACTAM 200 GRAM(S): 4; .5 INJECTION, POWDER, LYOPHILIZED, FOR SOLUTION INTRAVENOUS at 17:40

## 2017-07-16 RX ADMIN — OXYCODONE AND ACETAMINOPHEN 1 TABLET(S): 5; 325 TABLET ORAL at 17:40

## 2017-07-16 RX ADMIN — Medication 100 MILLIGRAM(S): at 17:41

## 2017-07-16 RX ADMIN — OXYCODONE AND ACETAMINOPHEN 1 TABLET(S): 5; 325 TABLET ORAL at 08:56

## 2017-07-16 RX ADMIN — Medication 400 MILLIGRAM(S): at 12:49

## 2017-07-16 RX ADMIN — OXYCODONE AND ACETAMINOPHEN 1 TABLET(S): 5; 325 TABLET ORAL at 10:21

## 2017-07-16 RX ADMIN — Medication 650 MILLIGRAM(S): at 17:41

## 2017-07-16 RX ADMIN — OXYCODONE AND ACETAMINOPHEN 1 TABLET(S): 5; 325 TABLET ORAL at 03:23

## 2017-07-16 RX ADMIN — OXYCODONE AND ACETAMINOPHEN 1 TABLET(S): 5; 325 TABLET ORAL at 13:30

## 2017-07-16 RX ADMIN — SODIUM CHLORIDE 100 MILLILITER(S): 9 INJECTION INTRAMUSCULAR; INTRAVENOUS; SUBCUTANEOUS at 02:56

## 2017-07-16 RX ADMIN — MORPHINE SULFATE 4 MILLIGRAM(S): 50 CAPSULE, EXTENDED RELEASE ORAL at 04:58

## 2017-07-16 RX ADMIN — Medication 400 MILLIGRAM(S): at 17:41

## 2017-07-16 RX ADMIN — Medication 100 MILLIGRAM(S): at 05:57

## 2017-07-16 RX ADMIN — OXYCODONE AND ACETAMINOPHEN 1 TABLET(S): 5; 325 TABLET ORAL at 02:55

## 2017-07-16 RX ADMIN — MORPHINE SULFATE 4 MILLIGRAM(S): 50 CAPSULE, EXTENDED RELEASE ORAL at 10:55

## 2017-07-16 RX ADMIN — Medication 400 MILLIGRAM(S): at 18:09

## 2017-07-16 RX ADMIN — MORPHINE SULFATE 4 MILLIGRAM(S): 50 CAPSULE, EXTENDED RELEASE ORAL at 10:26

## 2017-07-16 RX ADMIN — SODIUM CHLORIDE 100 MILLILITER(S): 9 INJECTION INTRAMUSCULAR; INTRAVENOUS; SUBCUTANEOUS at 10:26

## 2017-07-16 RX ADMIN — OXYCODONE AND ACETAMINOPHEN 1 TABLET(S): 5; 325 TABLET ORAL at 12:49

## 2017-07-16 RX ADMIN — Medication 100 GRAM(S): at 07:05

## 2017-07-16 RX ADMIN — OXYCODONE AND ACETAMINOPHEN 1 TABLET(S): 5; 325 TABLET ORAL at 18:10

## 2017-07-16 RX ADMIN — Medication 650 MILLIGRAM(S): at 10:21

## 2017-07-16 RX ADMIN — MORPHINE SULFATE 4 MILLIGRAM(S): 50 CAPSULE, EXTENDED RELEASE ORAL at 04:28

## 2017-07-16 RX ADMIN — Medication 250 MILLIGRAM(S): at 08:56

## 2017-07-16 RX ADMIN — PIPERACILLIN AND TAZOBACTAM 200 GRAM(S): 4; .5 INJECTION, POWDER, LYOPHILIZED, FOR SOLUTION INTRAVENOUS at 05:57

## 2017-07-16 NOTE — PROGRESS NOTE ADULT - ASSESSMENT
36 y.o. M patient s/p corpora cavernosa shunt 7/13, admitted yesterday for fevers and pain. 36 y.o. M patient s/p corpora cavernosa shunt 7/13, admitted yesterday for fevers and pain.  1) Keep jose, monitor output  2) f/u blood cultures, preliminary urine cultures +for GNR, will DC vanc and continue with zosyn  3) Cont scrotal support and ice packs  4) Pain control

## 2017-07-16 NOTE — PROGRESS NOTE ADULT - SUBJECTIVE AND OBJECTIVE BOX
AM Note    Tmax 101.6 at 8pm.      Vital Signs Last 24 Hrs  T(C): 37.3 (07-16-17 @ 05:06), Max: 38.9 (07-15-17 @ 19:39)  T(F): 99.2 (07-16-17 @ 05:06), Max: 102 (07-15-17 @ 19:39)  HR: 111 (07-16-17 @ 05:06) (111 - 140)  BP: 116/65 (07-16-17 @ 05:06) (94/57 - 128/70)  BP(mean): --  RR: 17 (07-16-17 @ 05:06) (16 - 18)  SpO2: 96% (07-16-17 @ 05:06) (95% - 98%)                          11.4   10.6  )-----------( 119      ( 16 Jul 2017 05:51 )             33.7        16 Jul 2017 05:52    135    |  100    |  10     ----------------------------<  114    4.4     |  24     |  0.80     Ca    7.7        16 Jul 2017 05:52  Phos  2.2       16 Jul 2017 05:52  Mg     1.3       16 Jul 2017 05:52    TPro  8.0    /  Alb  4.2    /  TBili  1.5    /  DBili  x      /  AST  75     /  ALT  82     /  AlkPhos  156    15 Jul 2017 16:55        I&O's Summary    15 Jul 2017 07:01  -  16 Jul 2017 06:36  --------------------------------------------------------  IN: 1650 mL / OUT: 1500 mL / NET: 150 mL          PHYSICAL EXAM:    GEN:  ABD:  SHAYY:  AM Note    Tmax 101.6 at 8pm.      Vital Signs Last 24 Hrs  T(C): 37.3 (07-16-17 @ 05:06), Max: 38.9 (07-15-17 @ 19:39)  T(F): 99.2 (07-16-17 @ 05:06), Max: 102 (07-15-17 @ 19:39)  HR: 111 (07-16-17 @ 05:06) (111 - 140)  BP: 116/65 (07-16-17 @ 05:06) (94/57 - 128/70)  BP(mean): --  RR: 17 (07-16-17 @ 05:06) (16 - 18)  SpO2: 96% (07-16-17 @ 05:06) (95% - 98%)                          11.4   10.6  )-----------( 119      ( 16 Jul 2017 05:51 )             33.7        16 Jul 2017 05:52    135    |  100    |  10     ----------------------------<  114    4.4     |  24     |  0.80     Ca    7.7        16 Jul 2017 05:52  Phos  2.2       16 Jul 2017 05:52  Mg     1.3       16 Jul 2017 05:52    TPro  8.0    /  Alb  4.2    /  TBili  1.5    /  DBili  x      /  AST  75     /  ALT  82     /  AlkPhos  156    15 Jul 2017 16:55        I&O's Summary    15 Jul 2017 07:01  -  16 Jul 2017 06:36  --------------------------------------------------------  IN: 1650 mL / OUT: 1500 mL / NET: 150 mL          PHYSICAL EXAM:    GEN: NAD  ABD: soft, ND, NT  : jose catheter draining clear urine, corpora cavernose remains erect, + appropriate penile TTP, no purulence or bleeding at meatus, no scrotal edema or erythma, b/l testes non TTP

## 2017-07-17 LAB
-  AMIKACIN: SIGNIFICANT CHANGE UP
-  AMPICILLIN/SULBACTAM: SIGNIFICANT CHANGE UP
-  AMPICILLIN: SIGNIFICANT CHANGE UP
-  CEFAZOLIN: SIGNIFICANT CHANGE UP
-  CEFTRIAXONE: SIGNIFICANT CHANGE UP
-  CIPROFLOXACIN: SIGNIFICANT CHANGE UP
-  GENTAMICIN: SIGNIFICANT CHANGE UP
-  IMIPENEM: SIGNIFICANT CHANGE UP
-  NITROFURANTOIN: SIGNIFICANT CHANGE UP
-  PIPERACILLIN/TAZOBACTAM: SIGNIFICANT CHANGE UP
-  TOBRAMYCIN: SIGNIFICANT CHANGE UP
-  TRIMETHOPRIM/SULFAMETHOXAZOLE: SIGNIFICANT CHANGE UP
ANION GAP SERPL CALC-SCNC: 11 MMOL/L — SIGNIFICANT CHANGE UP (ref 5–17)
BASOPHILS NFR BLD AUTO: 0.1 % — SIGNIFICANT CHANGE UP (ref 0–2)
BUN SERPL-MCNC: 14 MG/DL — SIGNIFICANT CHANGE UP (ref 7–23)
CALCIUM SERPL-MCNC: 8.1 MG/DL — LOW (ref 8.4–10.5)
CHLORIDE SERPL-SCNC: 100 MMOL/L — SIGNIFICANT CHANGE UP (ref 96–108)
CO2 SERPL-SCNC: 26 MMOL/L — SIGNIFICANT CHANGE UP (ref 22–31)
CREAT SERPL-MCNC: 0.7 MG/DL — SIGNIFICANT CHANGE UP (ref 0.5–1.3)
CULTURE RESULTS: SIGNIFICANT CHANGE UP
EOSINOPHIL NFR BLD AUTO: 1.7 % — SIGNIFICANT CHANGE UP (ref 0–6)
GLUCOSE SERPL-MCNC: 93 MG/DL — SIGNIFICANT CHANGE UP (ref 70–99)
GRAM STN FLD: SIGNIFICANT CHANGE UP
HCT VFR BLD CALC: 33.4 % — LOW (ref 39–50)
HGB BLD-MCNC: 11.3 G/DL — LOW (ref 13–17)
LYMPHOCYTES # BLD AUTO: 20.9 % — SIGNIFICANT CHANGE UP (ref 13–44)
MAGNESIUM SERPL-MCNC: 2.2 MG/DL — SIGNIFICANT CHANGE UP (ref 1.6–2.6)
MCHC RBC-ENTMCNC: 28.8 PG — SIGNIFICANT CHANGE UP (ref 27–34)
MCHC RBC-ENTMCNC: 33.8 G/DL — SIGNIFICANT CHANGE UP (ref 32–36)
MCV RBC AUTO: 85 FL — SIGNIFICANT CHANGE UP (ref 80–100)
METHOD TYPE: SIGNIFICANT CHANGE UP
MONOCYTES NFR BLD AUTO: 6.9 % — SIGNIFICANT CHANGE UP (ref 2–14)
NEUTROPHILS NFR BLD AUTO: 70.4 % — SIGNIFICANT CHANGE UP (ref 43–77)
ORGANISM # SPEC MICROSCOPIC CNT: SIGNIFICANT CHANGE UP
ORGANISM # SPEC MICROSCOPIC CNT: SIGNIFICANT CHANGE UP
PHOSPHATE SERPL-MCNC: 2.4 MG/DL — LOW (ref 2.5–4.5)
PLATELET # BLD AUTO: 78 K/UL — LOW (ref 150–400)
POTASSIUM SERPL-MCNC: 3.5 MMOL/L — SIGNIFICANT CHANGE UP (ref 3.5–5.3)
POTASSIUM SERPL-SCNC: 3.5 MMOL/L — SIGNIFICANT CHANGE UP (ref 3.5–5.3)
RBC # BLD: 3.93 M/UL — LOW (ref 4.2–5.8)
RBC # FLD: 14.8 % — SIGNIFICANT CHANGE UP (ref 10.3–16.9)
SODIUM SERPL-SCNC: 137 MMOL/L — SIGNIFICANT CHANGE UP (ref 135–145)
SPECIMEN SOURCE: SIGNIFICANT CHANGE UP
VANCOMYCIN TROUGH SERPL-MCNC: 9.7 UG/ML — LOW (ref 10–20)
WBC # BLD: 7.4 K/UL — SIGNIFICANT CHANGE UP (ref 3.8–10.5)
WBC # FLD AUTO: 7.4 K/UL — SIGNIFICANT CHANGE UP (ref 3.8–10.5)

## 2017-07-17 RX ORDER — POTASSIUM CHLORIDE 20 MEQ
40 PACKET (EA) ORAL ONCE
Qty: 0 | Refills: 0 | Status: COMPLETED | OUTPATIENT
Start: 2017-07-17 | End: 2017-07-17

## 2017-07-17 RX ORDER — VANCOMYCIN HCL 1 G
1250 VIAL (EA) INTRAVENOUS EVERY 12 HOURS
Qty: 0 | Refills: 0 | Status: DISCONTINUED | OUTPATIENT
Start: 2017-07-17 | End: 2017-07-17

## 2017-07-17 RX ORDER — MEROPENEM 1 G/30ML
1000 INJECTION INTRAVENOUS EVERY 8 HOURS
Qty: 0 | Refills: 0 | Status: DISCONTINUED | OUTPATIENT
Start: 2017-07-17 | End: 2017-07-30

## 2017-07-17 RX ORDER — MEROPENEM 1 G/30ML
INJECTION INTRAVENOUS
Qty: 0 | Refills: 0 | Status: DISCONTINUED | OUTPATIENT
Start: 2017-07-17 | End: 2017-07-17

## 2017-07-17 RX ORDER — MEROPENEM 1 G/30ML
1000 INJECTION INTRAVENOUS ONCE
Qty: 0 | Refills: 0 | Status: DISCONTINUED | OUTPATIENT
Start: 2017-07-17 | End: 2017-07-17

## 2017-07-17 RX ORDER — VANCOMYCIN HCL 1 G
1250 VIAL (EA) INTRAVENOUS EVERY 12 HOURS
Qty: 0 | Refills: 0 | Status: DISCONTINUED | OUTPATIENT
Start: 2017-07-17 | End: 2017-07-18

## 2017-07-17 RX ORDER — MEROPENEM 1 G/30ML
1000 INJECTION INTRAVENOUS EVERY 8 HOURS
Qty: 0 | Refills: 0 | Status: DISCONTINUED | OUTPATIENT
Start: 2017-07-17 | End: 2017-07-17

## 2017-07-17 RX ORDER — MEROPENEM 1 G/30ML
1000 INJECTION INTRAVENOUS ONCE
Qty: 0 | Refills: 0 | Status: COMPLETED | OUTPATIENT
Start: 2017-07-17 | End: 2017-07-17

## 2017-07-17 RX ADMIN — OXYCODONE AND ACETAMINOPHEN 1 TABLET(S): 5; 325 TABLET ORAL at 15:42

## 2017-07-17 RX ADMIN — MEROPENEM 100 MILLIGRAM(S): 1 INJECTION INTRAVENOUS at 15:38

## 2017-07-17 RX ADMIN — Medication 400 MILLIGRAM(S): at 00:41

## 2017-07-17 RX ADMIN — Medication 400 MILLIGRAM(S): at 18:19

## 2017-07-17 RX ADMIN — Medication 100 MILLIGRAM(S): at 05:03

## 2017-07-17 RX ADMIN — PIPERACILLIN AND TAZOBACTAM 200 GRAM(S): 4; .5 INJECTION, POWDER, LYOPHILIZED, FOR SOLUTION INTRAVENOUS at 05:03

## 2017-07-17 RX ADMIN — PIPERACILLIN AND TAZOBACTAM 200 GRAM(S): 4; .5 INJECTION, POWDER, LYOPHILIZED, FOR SOLUTION INTRAVENOUS at 12:42

## 2017-07-17 RX ADMIN — MEROPENEM 100 MILLIGRAM(S): 1 INJECTION INTRAVENOUS at 22:20

## 2017-07-17 RX ADMIN — Medication 100 MILLIGRAM(S): at 18:19

## 2017-07-17 RX ADMIN — Medication 166.67 MILLIGRAM(S): at 22:20

## 2017-07-17 RX ADMIN — Medication 166.67 MILLIGRAM(S): at 11:17

## 2017-07-17 RX ADMIN — Medication 400 MILLIGRAM(S): at 13:50

## 2017-07-17 RX ADMIN — Medication 400 MILLIGRAM(S): at 23:50

## 2017-07-17 RX ADMIN — Medication 400 MILLIGRAM(S): at 00:02

## 2017-07-17 RX ADMIN — Medication 40 MILLIEQUIVALENT(S): at 07:50

## 2017-07-17 RX ADMIN — Medication 250 MILLIGRAM(S): at 00:41

## 2017-07-17 RX ADMIN — Medication 400 MILLIGRAM(S): at 12:42

## 2017-07-17 RX ADMIN — Medication 400 MILLIGRAM(S): at 05:45

## 2017-07-17 RX ADMIN — Medication 400 MILLIGRAM(S): at 05:03

## 2017-07-17 RX ADMIN — Medication 400 MILLIGRAM(S): at 18:44

## 2017-07-17 RX ADMIN — OXYCODONE AND ACETAMINOPHEN 1 TABLET(S): 5; 325 TABLET ORAL at 16:29

## 2017-07-17 RX ADMIN — PIPERACILLIN AND TAZOBACTAM 200 GRAM(S): 4; .5 INJECTION, POWDER, LYOPHILIZED, FOR SOLUTION INTRAVENOUS at 00:02

## 2017-07-17 NOTE — PROGRESS NOTE ADULT - ASSESSMENT
36 y.o. M patient s/p corpora cavernosa shunt 7/13, admitted 7/15 for fevers and pain.  1) Keep jose, monitor output  2) f/u repeat urine culture (repeat blood cx positive for GNR)  3) continue Vanc/Zosyn  4) Cont scrotal support and ice packs  5) Pain control  6)CT 7/17  7)Possible OR for I&D 36 y.o. M patient s/p corpora cavernosa shunt 7/13, admitted 7/15 for fevers and pain.  1) Keep jose, monitor output  2) f/u repeat urine culture (repeat blood cx positive for GNR)  3) continue Vanc/Zosyn  4) Cont scrotal support and ice packs  5) Pain control  6)Possible CT 7/17 if spikes

## 2017-07-17 NOTE — PROGRESS NOTE ADULT - SUBJECTIVE AND OBJECTIVE BOX
SHAYY DUFFY NOTE    Patient is a 36y old  Male who presents with a chief complaint of pain and  feverish (15 Jul 2017 18:36)    Vital Signs Last 24 Hrs  T(C): 35.8 (16 Jul 2017 20:30), Max: 39 (16 Jul 2017 12:28)  T(F): 96.5 (16 Jul 2017 20:30), Max: 102.2 (16 Jul 2017 12:28)  HR: 82 (16 Jul 2017 20:30) (82 - 123)  BP: 103/71 (16 Jul 2017 20:30) (103/67 - 164/89)  BP(mean): --  RR: 16 (16 Jul 2017 20:30) (16 - 20)  SpO2: 98% (16 Jul 2017 20:30) (93% - 100%)    I&O's Summary    15 Jul 2017 07:01  -  16 Jul 2017 07:00  --------------------------------------------------------  IN: 1650 mL / OUT: 1500 mL / NET: 150 mL    16 Jul 2017 07:01  -  17 Jul 2017 02:18  --------------------------------------------------------  IN: 1290 mL / OUT: 2650 mL / NET: -1360 mL        Gen:    Abd:    SHAYY:                          11.4   10.6  )-----------( 119      ( 16 Jul 2017 05:51 )             33.7     07-16    135  |  100  |  10  ----------------------------<  114<H>  4.4   |  24  |  0.80    Ca    7.7<L>      16 Jul 2017 05:52  Phos  2.2     07-16  Mg     1.3     07-16    TPro  8.0  /  Alb  4.2  /  TBili  1.5<H>  /  DBili  x   /  AST  75<H>  /  ALT  82<H>  /  AlkPhos  156<H>  07-15    cultures SHAYY AM NOTE    No acute events O/N    Patient is a 36y old  Male who presents with a chief complaint of pain and  feverish (15 Jul 2017 18:36)    Vital Signs Last 24 Hrs  T(C): 35.8 (16 Jul 2017 20:30), Max: 39 (16 Jul 2017 12:28)  T(F): 96.5 (16 Jul 2017 20:30), Max: 102.2 (16 Jul 2017 12:28)  HR: 82 (16 Jul 2017 20:30) (82 - 123)  BP: 103/71 (16 Jul 2017 20:30) (103/67 - 164/89)  BP(mean): --  RR: 16 (16 Jul 2017 20:30) (16 - 20)  SpO2: 98% (16 Jul 2017 20:30) (93% - 100%)    I&O's Summary    15 Jul 2017 07:01  -  16 Jul 2017 07:00  --------------------------------------------------------  IN: 1650 mL / OUT: 1500 mL / NET: 150 mL    16 Jul 2017 07:01  -  17 Jul 2017 02:18  --------------------------------------------------------  IN: 1290 mL / OUT: 2650 mL / NET: -1360 mL        Gen:    Abd:    :                          11.4   10.6  )-----------( 119      ( 16 Jul 2017 05:51 )             33.7     07-16    135  |  100  |  10  ----------------------------<  114<H>  4.4   |  24  |  0.80    Ca    7.7<L>      16 Jul 2017 05:52  Phos  2.2     07-16  Mg     1.3     07-16    TPro  8.0  /  Alb  4.2  /  TBili  1.5<H>  /  DBili  x   /  AST  75<H>  /  ALT  82<H>  /  AlkPhos  156<H>  07-15    cultures  AM NOTE    No acute events O/N. c/o some discomfort penis.     Patient is a 36y old  Male who presents with a chief complaint of pain and  feverish (15 Jul 2017 18:36)    Vital Signs Last 24 Hrs  T(C): 35.8 (16 Jul 2017 20:30), Max: 39 (16 Jul 2017 12:28)  T(F): 96.5 (16 Jul 2017 20:30), Max: 102.2 (16 Jul 2017 12:28)  HR: 82 (16 Jul 2017 20:30) (82 - 123)  BP: 103/71 (16 Jul 2017 20:30) (103/67 - 164/89)  BP(mean): --  RR: 16 (16 Jul 2017 20:30) (16 - 20)  SpO2: 98% (16 Jul 2017 20:30) (93% - 100%)    I&O's Summary    15 Jul 2017 07:01  -  16 Jul 2017 07:00  --------------------------------------------------------  IN: 1650 mL / OUT: 1500 mL / NET: 150 mL    16 Jul 2017 07:01  -  17 Jul 2017 02:18  --------------------------------------------------------  IN: 1290 mL / OUT: 2650 mL / NET: -1360 mL        Gen: alert and awake    Abd: soft, NT, ND    : FC intact, urine clear. +erect penis                          11.4   10.6  )-----------( 119      ( 16 Jul 2017 05:51 )             33.7     07-16    135  |  100  |  10  ----------------------------<  114<H>  4.4   |  24  |  0.80    Ca    7.7<L>      16 Jul 2017 05:52  Phos  2.2     07-16  Mg     1.3     07-16    TPro  8.0  /  Alb  4.2  /  TBili  1.5<H>  /  DBili  x   /  AST  75<H>  /  ALT  82<H>  /  AlkPhos  156<H>  07-15    cultures  AM NOTE    No acute events O/N. c/o some discomfort penis.     Patient is a 36y old  Male who presents with a chief complaint of pain and  feverish (15 Jul 2017 18:36)    Vital Signs Last 24 Hrs  T(C): 35.8 (16 Jul 2017 20:30), Max: 39 (16 Jul 2017 12:28)  T(F): 96.5 (16 Jul 2017 20:30), Max: 102.2 (16 Jul 2017 12:28)  HR: 82 (16 Jul 2017 20:30) (82 - 123)  BP: 103/71 (16 Jul 2017 20:30) (103/67 - 164/89)  BP(mean): --  RR: 16 (16 Jul 2017 20:30) (16 - 20)  SpO2: 98% (16 Jul 2017 20:30) (93% - 100%)    I&O's Summary    15 Jul 2017 07:01  -  16 Jul 2017 07:00  --------------------------------------------------------  IN: 1650 mL / OUT: 1500 mL / NET: 150 mL    16 Jul 2017 07:01  -  17 Jul 2017 02:18  --------------------------------------------------------  IN: 1290 mL / OUT: 2650 mL / NET: -1360 mL        Gen: alert and awake    Abd: soft, NT, ND    : FC intact and taped upward,  urine clear. +erect penis, healing bruises along shaft with some TTP,  slight blood noted at meatus and dressing.                           11.4   10.6  )-----------( 119      ( 16 Jul 2017 05:51 )             33.7     07-16    135  |  100  |  10  ----------------------------<  114<H>  4.4   |  24  |  0.80    Ca    7.7<L>      16 Jul 2017 05:52  Phos  2.2     07-16  Mg     1.3     07-16    TPro  8.0  /  Alb  4.2  /  TBili  1.5<H>  /  DBili  x   /  AST  75<H>  /  ALT  82<H>  /  AlkPhos  156<H>  07-15    cultures

## 2017-07-17 NOTE — CONSULT NOTE ADULT - ASSESSMENT
36M with PMHx of illicit drug use, admitted last week for priapism for over 24 hours after self-injecting Trimix s/p corpora cavernosa shunt on 7/13 then discharge the next day, returns with urosepsis 2/2 ESBL E. coli and GNR bacteremia.     #Sepsis with bacteremia: Patient presents 1 day after discharge from [facility] for management of priapism with corpora cavernosa shunt on 7/13. On presentation, patient septic (febrile, tachycardic, +leukocytosis). Started on Vanc and Zosyn. UCx growing ESBL E. coli so patient switched to Meropenem (7/17 - ). Blood cultures growing GNR, likely also E. coli for which patient covered with Meropenem. On physical exam, patient has significant tenderness to penile shaft. Concern for cellulitis given recent manipulation.   - continue Meropenem 1g Q8hrs (7/17 - )  - start Vancomycin 1250mg Q12hrs (7/17 - ), obtain trough prior to 4th dose   - collect surveillance blood cultures   - f/u culture data for speciation and sensitivities     ID will follow. Discussed with Dr. Juárez and primary team.

## 2017-07-17 NOTE — CONSULT NOTE ADULT - SUBJECTIVE AND OBJECTIVE BOX
HPI:  36M with PMHx of illicit drug use, was admitted last week for priapism for over 24 hours after self-injecting Trimix. Pt underwent a corpora cavernosa shunt  and was discharged on  with jose and on Bactrim to complete 14d course. Patient unable to fill prescription due to financial difficulties. Returned on 7/15 with reported increased penile pain, feeling feverish and chills. Denies other complaints. Jose catheter draining clear. Patient initially started on Vancomycin and Zosyn, later switched to Meropenem after UCx growing ESBL E. coli. Found to have GNR bacteremia, likely septicemia 2/2 urosepsis. ID consulted for antibiotic management.     ROS: +penile pain    PAST MEDICAL & SURGICAL HISTORY:  Illicit drug use  Priapism  Priapism, drug-induced  No significant past surgical history    MEDICATIONS  (STANDING):  docusate sodium 100 milliGRAM(s) Oral two times a day  ibuprofen  Tablet 400 milliGRAM(s) Oral every 6 hours  vancomycin  IVPB 1250 milliGRAM(s) IV Intermittent every 12 hours  meropenem IVPB 1000 milliGRAM(s) IV Intermittent every 8 hours    MEDICATIONS  (PRN):  acetaminophen   Tablet 650 milliGRAM(s) Oral every 6 hours PRN For Temp greater than 38 C (100.4 F)  acetaminophen   Tablet. 650 milliGRAM(s) Oral every 6 hours PRN Mild Pain (1 - 3)  ondansetron Injectable 4 milliGRAM(s) IV Push every 6 hours PRN Nausea and/or Vomiting  senna 2 Tablet(s) Oral at bedtime PRN Constipation  oxyCODONE    5 mG/acetaminophen 325 mG 1 Tablet(s) Oral every 4 hours PRN Moderate Pain (4 - 6)  morphine  - Injectable 4 milliGRAM(s) IV Push every 6 hours PRN Severe Pain (7 - 10)    ALLERGIES: NKDA      VITAL SIGNS:    T(C): 36.7 (2017 17:29), Max: 37.1 (2017 08:38)  T(F): 98 (2017 17:29), Max: 98.7 (2017 08:38)  HR: 89 (2017 17:29) (72 - 89)  BP: 137/68 (2017 17:29) (97/60 - 137/68)  RR: 16 (2017 17:29) (16 - 19)  SpO2: 98% (2017 17:29) (96% - 100%)    PHYSICAL EXAM:  Constitutional: well-developed, well-nourished young male, NAD  HEENT: NCAT, EOMI  : erect penis, bruising along shaft, tenderness to palpation, blood on dressing at the meatus, no pus, scrotum non-tender, no lesions appreciated. Jose in place   Ext: WWP, no LE edema      LABS:                        11.3   7.4   )-----------( 78       ( 2017 06:23 )             33.4     137  |  100  |  14  ----------------------------<  93  3.5   |  26  |  0.70    Ca    8.1<L>     Phos  2.4    Mg     2.2         Culture - Blood (17 @ 14:13)    Gram Stain:   Growth in aerobic and anaerobic bottles: Gram Negative Rods  Result called to and read back by_ LUCY Cohen RN  2017 09:40:27    Specimen Source: .Blood Blood    Culture Results:   Culture in progress    Culture - Urine (17 @ 14:13)    Specimen Source: .Urine Catheterized    Culture Results:   No growth to date    Urinalysis Basic - ( 2017 13:00 )  Color: Yellow / Appearance: Clear / S.015 / pH: x  Gluc: x / Ketone: NEGATIVE  / Bili: Small / Urobili: >=8.0 E.U./dL   Blood: x / Protein: 30 mg/dL / Nitrite: NEGATIVE   Leuk Esterase: NEGATIVE / RBC: < 5 /HPF / WBC > 10 /HPF   Sq Epi: x / Non Sq Epi: Rare /HPF / Bacteria: Present /HPF    Culture - Blood (17 @ 00:45)    -  Escherichia coli: Detec    Gram Stain:   Anaerobic Bottle: Gram Negative Rods  Result called to and read back by_ KEVIN Gan RN  2017 13:23:13  Aerobic Bottle: Gram Negative Rods  Result called to and read back byGilbert Woo RN  2017 20:44:58  ***Blood Panel PCR results on thisspecimen are available  approximately 3 hours after the Gram stain result.***  Gram stain, PCR, and/or culture results may not always  correspond due to difference in methodologies.    Specimen Source: .Blood Blood    Organism: Blood Culture PCR    Culture Results:   Growth in aerobic and anaerobic bottles: Gram Negative Rods  Identification and susceptibility to follow.    Organism Identification: Blood Culture PCR    Method Type: PCR

## 2017-07-18 ENCOUNTER — APPOINTMENT (OUTPATIENT)
Dept: UROLOGY | Facility: CLINIC | Age: 37
End: 2017-07-18

## 2017-07-18 PROBLEM — Z00.00 ENCOUNTER FOR PREVENTIVE HEALTH EXAMINATION: Status: ACTIVE | Noted: 2017-07-18

## 2017-07-18 LAB
-  AMIKACIN: SIGNIFICANT CHANGE UP
-  AMPICILLIN/SULBACTAM: SIGNIFICANT CHANGE UP
-  AMPICILLIN: SIGNIFICANT CHANGE UP
-  CEFAZOLIN: SIGNIFICANT CHANGE UP
-  CEFTRIAXONE: SIGNIFICANT CHANGE UP
-  CIPROFLOXACIN: SIGNIFICANT CHANGE UP
-  GENTAMICIN: SIGNIFICANT CHANGE UP
-  IMIPENEM: SIGNIFICANT CHANGE UP
-  PIPERACILLIN/TAZOBACTAM: SIGNIFICANT CHANGE UP
-  TOBRAMYCIN: SIGNIFICANT CHANGE UP
-  TRIMETHOPRIM/SULFAMETHOXAZOLE: SIGNIFICANT CHANGE UP
ANION GAP SERPL CALC-SCNC: 11 MMOL/L — SIGNIFICANT CHANGE UP (ref 5–17)
BUN SERPL-MCNC: 9 MG/DL — SIGNIFICANT CHANGE UP (ref 7–23)
CALCIUM SERPL-MCNC: 8.2 MG/DL — LOW (ref 8.4–10.5)
CHLORIDE SERPL-SCNC: 102 MMOL/L — SIGNIFICANT CHANGE UP (ref 96–108)
CO2 SERPL-SCNC: 27 MMOL/L — SIGNIFICANT CHANGE UP (ref 22–31)
CREAT SERPL-MCNC: 0.6 MG/DL — SIGNIFICANT CHANGE UP (ref 0.5–1.3)
CULTURE RESULTS: NO GROWTH — SIGNIFICANT CHANGE UP
CULTURE RESULTS: SIGNIFICANT CHANGE UP
CULTURE RESULTS: SIGNIFICANT CHANGE UP
GLUCOSE SERPL-MCNC: 90 MG/DL — SIGNIFICANT CHANGE UP (ref 70–99)
HCT VFR BLD CALC: 33.4 % — LOW (ref 39–50)
HGB BLD-MCNC: 11.3 G/DL — LOW (ref 13–17)
MAGNESIUM SERPL-MCNC: 1.8 MG/DL — SIGNIFICANT CHANGE UP (ref 1.6–2.6)
MCHC RBC-ENTMCNC: 28.9 PG — SIGNIFICANT CHANGE UP (ref 27–34)
MCHC RBC-ENTMCNC: 33.8 G/DL — SIGNIFICANT CHANGE UP (ref 32–36)
MCV RBC AUTO: 85.4 FL — SIGNIFICANT CHANGE UP (ref 80–100)
METHOD TYPE: SIGNIFICANT CHANGE UP
METHOD TYPE: SIGNIFICANT CHANGE UP
ORGANISM # SPEC MICROSCOPIC CNT: SIGNIFICANT CHANGE UP
PHOSPHATE SERPL-MCNC: 3.3 MG/DL — SIGNIFICANT CHANGE UP (ref 2.5–4.5)
PLATELET # BLD AUTO: 93 K/UL — LOW (ref 150–400)
POTASSIUM SERPL-MCNC: 3.7 MMOL/L — SIGNIFICANT CHANGE UP (ref 3.5–5.3)
POTASSIUM SERPL-SCNC: 3.7 MMOL/L — SIGNIFICANT CHANGE UP (ref 3.5–5.3)
RBC # BLD: 3.91 M/UL — LOW (ref 4.2–5.8)
RBC # FLD: 14.8 % — SIGNIFICANT CHANGE UP (ref 10.3–16.9)
SODIUM SERPL-SCNC: 140 MMOL/L — SIGNIFICANT CHANGE UP (ref 135–145)
SPECIMEN SOURCE: SIGNIFICANT CHANGE UP
VANCOMYCIN TROUGH SERPL-MCNC: 6.6 UG/ML — LOW (ref 10–20)
WBC # BLD: 7.2 K/UL — SIGNIFICANT CHANGE UP (ref 3.8–10.5)
WBC # FLD AUTO: 7.2 K/UL — SIGNIFICANT CHANGE UP (ref 3.8–10.5)

## 2017-07-18 RX ORDER — POTASSIUM CHLORIDE 20 MEQ
40 PACKET (EA) ORAL ONCE
Qty: 0 | Refills: 0 | Status: COMPLETED | OUTPATIENT
Start: 2017-07-18 | End: 2017-07-18

## 2017-07-18 RX ORDER — MAGNESIUM SULFATE 500 MG/ML
1 VIAL (ML) INJECTION ONCE
Qty: 0 | Refills: 0 | Status: COMPLETED | OUTPATIENT
Start: 2017-07-18 | End: 2017-07-18

## 2017-07-18 RX ORDER — VANCOMYCIN HCL 1 G
1500 VIAL (EA) INTRAVENOUS EVERY 12 HOURS
Qty: 0 | Refills: 0 | Status: DISCONTINUED | OUTPATIENT
Start: 2017-07-18 | End: 2017-07-21

## 2017-07-18 RX ADMIN — Medication 400 MILLIGRAM(S): at 11:10

## 2017-07-18 RX ADMIN — Medication 100 MILLIGRAM(S): at 06:42

## 2017-07-18 RX ADMIN — OXYCODONE AND ACETAMINOPHEN 1 TABLET(S): 5; 325 TABLET ORAL at 14:13

## 2017-07-18 RX ADMIN — Medication 400 MILLIGRAM(S): at 17:44

## 2017-07-18 RX ADMIN — MORPHINE SULFATE 4 MILLIGRAM(S): 50 CAPSULE, EXTENDED RELEASE ORAL at 21:19

## 2017-07-18 RX ADMIN — Medication 166.67 MILLIGRAM(S): at 11:10

## 2017-07-18 RX ADMIN — Medication 100 MILLIGRAM(S): at 17:45

## 2017-07-18 RX ADMIN — Medication 40 MILLIEQUIVALENT(S): at 07:14

## 2017-07-18 RX ADMIN — MEROPENEM 100 MILLIGRAM(S): 1 INJECTION INTRAVENOUS at 14:00

## 2017-07-18 RX ADMIN — Medication 400 MILLIGRAM(S): at 06:42

## 2017-07-18 RX ADMIN — Medication 400 MILLIGRAM(S): at 18:30

## 2017-07-18 RX ADMIN — Medication 100 GRAM(S): at 07:13

## 2017-07-18 RX ADMIN — Medication 400 MILLIGRAM(S): at 07:42

## 2017-07-18 RX ADMIN — OXYCODONE AND ACETAMINOPHEN 1 TABLET(S): 5; 325 TABLET ORAL at 15:00

## 2017-07-18 RX ADMIN — Medication 400 MILLIGRAM(S): at 00:50

## 2017-07-18 RX ADMIN — Medication 400 MILLIGRAM(S): at 12:00

## 2017-07-18 RX ADMIN — MEROPENEM 100 MILLIGRAM(S): 1 INJECTION INTRAVENOUS at 06:42

## 2017-07-18 RX ADMIN — Medication 300 MILLIGRAM(S): at 21:09

## 2017-07-18 RX ADMIN — MORPHINE SULFATE 4 MILLIGRAM(S): 50 CAPSULE, EXTENDED RELEASE ORAL at 21:35

## 2017-07-18 RX ADMIN — MEROPENEM 100 MILLIGRAM(S): 1 INJECTION INTRAVENOUS at 21:10

## 2017-07-18 NOTE — PROGRESS NOTE ADULT - SUBJECTIVE AND OBJECTIVE BOX
AM NOTE    Patient is a 36y old  Male who presents with a chief complaint of pain, fever (15 Jul 2017 18:36)    No acute events o/n        Vital Signs Last 24 Hrs  T(C): 36.6 (18 Jul 2017 05:15), Max: 37.1 (17 Jul 2017 08:38)  T(F): 97.9 (18 Jul 2017 05:15), Max: 98.7 (17 Jul 2017 08:38)  HR: 75 (18 Jul 2017 05:15) (75 - 89)  BP: 116/65 (18 Jul 2017 05:15) (109/69 - 137/68)  BP(mean): --  RR: 17 (18 Jul 2017 05:15) (16 - 19)  SpO2: 96% (18 Jul 2017 05:15) (95% - 100%)    I&O's Summary    16 Jul 2017 07:01  -  17 Jul 2017 07:00  --------------------------------------------------------  IN: 1570 mL / OUT: 3150 mL / NET: -1580 mL    17 Jul 2017 07:01  -  18 Jul 2017 06:24  --------------------------------------------------------  IN: 1040 mL / OUT: 3500 mL / NET: -2460 mL        Gen: NAD    Abd: soft, NTND    : +erection, +blood at meatus, +bruising along shaft, appropriately ttp, scrotum NTTP, no lesions, FC intact and draining                          11.3   7.2   )-----------( 93       ( 18 Jul 2017 05:45 )             33.4     07-18    140  |  102  |  9   ----------------------------<  90  3.7   |  27  |  0.60    Ca    8.2<L>      18 Jul 2017 05:45  Phos  3.3     07-18  Mg     1.8     07-18      cultures    A/P  36M s/p corpora cavernosa shunt with urethral injury 7/13 now on contact with 102.6T on 7/16  -cont abx  -monitor UO  -dvt ppx  -cont contact precautions   -diet: reg  -pain meds PRN  -VT this morning 9am (ordered)

## 2017-07-18 NOTE — PROVIDER CONTACT NOTE (CRITICAL VALUE NOTIFICATION) - TEST AND RESULT REPORTED:
Blood culture , collected on 07/15/17 positive of E Coli and ESBL
gram negative rods growth in aerobic bottle collected 7/16
positive gram negative rods in blood cultures

## 2017-07-19 DIAGNOSIS — F19.10 OTHER PSYCHOACTIVE SUBSTANCE ABUSE, UNCOMPLICATED: ICD-10-CM
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
## 2017-07-19 DIAGNOSIS — N48.33 PRIAPISM, DRUG-INDUCED: ICD-10-CM

## 2017-07-19 DIAGNOSIS — F43.21 ADJUSTMENT DISORDER WITH DEPRESSED MOOD: ICD-10-CM

## 2017-07-19 DIAGNOSIS — L27.0 GENERALIZED SKIN ERUPTION DUE TO DRUGS AND MEDICAMENTS TAKEN INTERNALLY: ICD-10-CM

## 2017-07-19 DIAGNOSIS — T46.7X5A ADVERSE EFFECT OF PERIPHERAL VASODILATORS, INITIAL ENCOUNTER: ICD-10-CM

## 2017-07-19 DIAGNOSIS — F15.10 OTHER STIMULANT ABUSE, UNCOMPLICATED: ICD-10-CM

## 2017-07-19 LAB
ANION GAP SERPL CALC-SCNC: 13 MMOL/L — SIGNIFICANT CHANGE UP (ref 5–17)
BUN SERPL-MCNC: 8 MG/DL — SIGNIFICANT CHANGE UP (ref 7–23)
CALCIUM SERPL-MCNC: 8.6 MG/DL — SIGNIFICANT CHANGE UP (ref 8.4–10.5)
CHLORIDE SERPL-SCNC: 100 MMOL/L — SIGNIFICANT CHANGE UP (ref 96–108)
CO2 SERPL-SCNC: 26 MMOL/L — SIGNIFICANT CHANGE UP (ref 22–31)
CREAT SERPL-MCNC: 0.6 MG/DL — SIGNIFICANT CHANGE UP (ref 0.5–1.3)
CULTURE RESULTS: SIGNIFICANT CHANGE UP
GLUCOSE SERPL-MCNC: 85 MG/DL — SIGNIFICANT CHANGE UP (ref 70–99)
HCT VFR BLD CALC: 35.5 % — LOW (ref 39–50)
HGB BLD-MCNC: 12.3 G/DL — LOW (ref 13–17)
MAGNESIUM SERPL-MCNC: 1.9 MG/DL — SIGNIFICANT CHANGE UP (ref 1.6–2.6)
MCHC RBC-ENTMCNC: 29.4 PG — SIGNIFICANT CHANGE UP (ref 27–34)
MCHC RBC-ENTMCNC: 34.6 G/DL — SIGNIFICANT CHANGE UP (ref 32–36)
MCV RBC AUTO: 84.9 FL — SIGNIFICANT CHANGE UP (ref 80–100)
METHOD TYPE: SIGNIFICANT CHANGE UP
ORGANISM # SPEC MICROSCOPIC CNT: SIGNIFICANT CHANGE UP
ORGANISM # SPEC MICROSCOPIC CNT: SIGNIFICANT CHANGE UP
PHOSPHATE SERPL-MCNC: 4 MG/DL — SIGNIFICANT CHANGE UP (ref 2.5–4.5)
PLATELET # BLD AUTO: 115 K/UL — LOW (ref 150–400)
POTASSIUM SERPL-MCNC: 4.1 MMOL/L — SIGNIFICANT CHANGE UP (ref 3.5–5.3)
POTASSIUM SERPL-SCNC: 4.1 MMOL/L — SIGNIFICANT CHANGE UP (ref 3.5–5.3)
RBC # BLD: 4.18 M/UL — LOW (ref 4.2–5.8)
RBC # FLD: 14.6 % — SIGNIFICANT CHANGE UP (ref 10.3–16.9)
SODIUM SERPL-SCNC: 139 MMOL/L — SIGNIFICANT CHANGE UP (ref 135–145)
SPECIMEN SOURCE: SIGNIFICANT CHANGE UP
WBC # BLD: 8.4 K/UL — SIGNIFICANT CHANGE UP (ref 3.8–10.5)
WBC # FLD AUTO: 8.4 K/UL — SIGNIFICANT CHANGE UP (ref 3.8–10.5)

## 2017-07-19 PROCEDURE — 99223 1ST HOSP IP/OBS HIGH 75: CPT

## 2017-07-19 RX ORDER — HEPARIN SODIUM 5000 [USP'U]/ML
5000 INJECTION INTRAVENOUS; SUBCUTANEOUS EVERY 12 HOURS
Qty: 0 | Refills: 0 | Status: DISCONTINUED | OUTPATIENT
Start: 2017-07-19 | End: 2017-07-30

## 2017-07-19 RX ORDER — MAGNESIUM OXIDE 400 MG ORAL TABLET 241.3 MG
400 TABLET ORAL ONCE
Qty: 0 | Refills: 0 | Status: COMPLETED | OUTPATIENT
Start: 2017-07-19 | End: 2017-07-19

## 2017-07-19 RX ADMIN — Medication 300 MILLIGRAM(S): at 22:35

## 2017-07-19 RX ADMIN — Medication 400 MILLIGRAM(S): at 17:14

## 2017-07-19 RX ADMIN — Medication 100 MILLIGRAM(S): at 05:35

## 2017-07-19 RX ADMIN — Medication 400 MILLIGRAM(S): at 18:00

## 2017-07-19 RX ADMIN — MORPHINE SULFATE 4 MILLIGRAM(S): 50 CAPSULE, EXTENDED RELEASE ORAL at 22:35

## 2017-07-19 RX ADMIN — MORPHINE SULFATE 4 MILLIGRAM(S): 50 CAPSULE, EXTENDED RELEASE ORAL at 04:30

## 2017-07-19 RX ADMIN — Medication 300 MILLIGRAM(S): at 10:21

## 2017-07-19 RX ADMIN — Medication 400 MILLIGRAM(S): at 05:35

## 2017-07-19 RX ADMIN — Medication 400 MILLIGRAM(S): at 06:35

## 2017-07-19 RX ADMIN — MORPHINE SULFATE 4 MILLIGRAM(S): 50 CAPSULE, EXTENDED RELEASE ORAL at 04:16

## 2017-07-19 RX ADMIN — Medication 400 MILLIGRAM(S): at 01:36

## 2017-07-19 RX ADMIN — Medication 100 MILLIGRAM(S): at 17:15

## 2017-07-19 RX ADMIN — MORPHINE SULFATE 4 MILLIGRAM(S): 50 CAPSULE, EXTENDED RELEASE ORAL at 14:29

## 2017-07-19 RX ADMIN — MEROPENEM 100 MILLIGRAM(S): 1 INJECTION INTRAVENOUS at 13:00

## 2017-07-19 RX ADMIN — Medication 400 MILLIGRAM(S): at 00:36

## 2017-07-19 RX ADMIN — MEROPENEM 100 MILLIGRAM(S): 1 INJECTION INTRAVENOUS at 22:35

## 2017-07-19 RX ADMIN — Medication 400 MILLIGRAM(S): at 23:17

## 2017-07-19 RX ADMIN — MORPHINE SULFATE 4 MILLIGRAM(S): 50 CAPSULE, EXTENDED RELEASE ORAL at 22:50

## 2017-07-19 RX ADMIN — OXYCODONE AND ACETAMINOPHEN 1 TABLET(S): 5; 325 TABLET ORAL at 19:40

## 2017-07-19 RX ADMIN — HEPARIN SODIUM 5000 UNIT(S): 5000 INJECTION INTRAVENOUS; SUBCUTANEOUS at 17:15

## 2017-07-19 RX ADMIN — Medication 400 MILLIGRAM(S): at 13:40

## 2017-07-19 RX ADMIN — MEROPENEM 100 MILLIGRAM(S): 1 INJECTION INTRAVENOUS at 05:35

## 2017-07-19 RX ADMIN — OXYCODONE AND ACETAMINOPHEN 1 TABLET(S): 5; 325 TABLET ORAL at 18:56

## 2017-07-19 RX ADMIN — Medication 400 MILLIGRAM(S): at 12:44

## 2017-07-19 RX ADMIN — MAGNESIUM OXIDE 400 MG ORAL TABLET 400 MILLIGRAM(S): 241.3 TABLET ORAL at 10:20

## 2017-07-19 RX ADMIN — MORPHINE SULFATE 4 MILLIGRAM(S): 50 CAPSULE, EXTENDED RELEASE ORAL at 14:17

## 2017-07-19 NOTE — BEHAVIORAL HEALTH ASSESSMENT NOTE - HPI (INCLUDE ILLNESS QUALITY, SEVERITY, DURATION, TIMING, CONTEXT, MODIFYING FACTORS, ASSOCIATED SIGNS AND SYMPTOMS)
36M unemployed, living with parents in [place], history of methamphetamine use disorder, reported history of depression, prior outpatient treatment, denies PMH, presenting originally for priapism treated with shunt 7/13, discharged 7/14 and returning 7/15 for E. coli sepsis with continued priapism.  Psychiatry consulted as patient reporting depression.    On interview patient reports he originally developed priapism due to injecting himself with ED medication and meth during party in [place] with friends.  Says he is aware that original incident was his fault but his upset about recent infection, upset that he does not know plan, and worried about continued penile pain.  Feels hurt that friends and family have not visited him in the hospital and feels lonely and ignored in contact isolation hospital room.  Reports depressed mood in setting of hospitalization but denies changes in sleep, appetite, energy, or concentration and denies anhedonia.  Reports history of chronic intermittent suicidal thoughts during times of stress but denies currently.  Denies AH/VH.  Denies SI/HI.  Denies manic symptoms.

## 2017-07-19 NOTE — PROGRESS NOTE ADULT - ATTENDING COMMENTS
pain overall improving. no subjective fevers.     on exam:  erect phallus though slightly softer and fibrotic at this time  penile edema essentially resolved  shunt sutures clean and dry  no erythema or cellulitis of penis noted  no tenderness throughout shaft or down into perineum  no fluctuance noted  small amount old blood expressed from urethral meatus around jose catheter  urine per jose is clear    last blood cultures now negative  remains on meropenem    clinically stable s/p failed penile shunt for long standing priapism. possible urethral injury requiring jose catheterization x 4-6 weeks. +cystitis with bacteremia with ESBL e coli. He is medically stable and requires IV abx at this time. Given insurance issues patient is apparently not eligible for home IV infusions. to discuss with case management and social work. will continue antibiotics in hospital unless alternative treatment options in less intensive setting becomes available to patient.    questions regarding infection answered. unclear initial source of infection given patients previous social history. again discussed priapism - patient understands again that additional priapism surgery was unlikely to lead to successful detumescence and safest route at this time may be to allow priapism to burn out - which he had requested previously after initial failure of T shunt and snake maneuver.

## 2017-07-19 NOTE — PROGRESS NOTE ADULT - SUBJECTIVE AND OBJECTIVE BOX
INTERVAL HPI/OVERNIGHT EVENTS: Afebrile.     SUBJECTIVE: Patient seen and examined at bedside. Patient reports some mild penile tenderness, but improved from prior days. Notes some softening of the phallus. Patient notes that he's upset that he's still in the hospital and without having information about what the next steps are.     VITAL SIGNS:  T(C): 36.7 (19 Jul 2017 14:24), Max: 37.4 (18 Jul 2017 20:05)  T(F): 98 (19 Jul 2017 14:24), Max: 99.3 (18 Jul 2017 20:05)  HR: 86 (19 Jul 2017 14:24) (74 - 89)  BP: 127/80 (19 Jul 2017 14:24) (118/72 - 139/77)  RR: 18 (19 Jul 2017 14:24) (15 - 18)  SpO2: 96% (19 Jul 2017 14:24) (95% - 100%)    PHYSICAL EXAM:  Constitutional: well-developed, well-nourished young male, NAD  HEENT: NCAT, EOMI  : erect penis with some softening since last exam, bruising along shaft, mild tenderness to palpation, blood on dressing at the meatus, no pus, scrotum non-tender, no lesions appreciated. Condon in place   Ext: WWP, no LE edema      LABS:                        12.3   8.4   )-----------( 115      ( 19 Jul 2017 07:26 )             35.5     139  |  100  |  8   ----------------------------<  85  4.1   |  26  |  0.60    Ca    8.6      Phos  4.0     Mg     1.9        Culture - Blood (07.18.17 @ 17:52)    Specimen Source: .Blood None    Culture Results:   No growth at 12 hours    Culture - Blood (07.16.17 @ 14:13)    Gram Stain:   Growth in aerobic and anaerobic bottles: Gram Negative Rods  Result called to and read back byGilbert Cohen RN  07/17/2017 09:40:27    Specimen Source: .Blood Blood    Organism: Escherichia coli ESBL    Culture Results:   Growth in aerobic and anaerobic bottles: Escherichia coli ESBL  Floor previously notified.    Organism Identification: Escherichia coli ESBL    Method Type: ALTON    Culture - Blood (07.16.17 @ 00:45)    Gram Stain:   Anaerobic Bottle: Gram Negative Rods  Result called to and read back by_ KEVIN Gan RN  07/16/2017 13:23:13  Aerobic Bottle: Gram Negative Rods  Result called to and read back by_ RENAE Woo RN  07/16/2017 20:44:58  ***Blood Panel PCR results on thisspecimen are available  approximately 3 hours after the Gram stain result.***  Gram stain, PCR, and/or culture results may not always  correspond due to difference in methodologies.    -  Escherichia coli: Detec    Specimen Source: .Blood Blood    Organism: Blood Culture PCR    Organism: Escherichia coli ESBL    Culture Results:   Growth in aerobic and anaerobic bottles: Escherichia coli ESBL  See previous culture for susceptibility results    Organism Identification: Blood Culture PCR  Escherichia coli ESBL    Method Type: ALTON    Method Type: PCR    Culture - Urine (07.15.17 @ 18:46)    -  Ampicillin: R >16    -  Ampicillin/Sulbactam: R >16/8    -  Cefazolin: R >16    -  Ceftriaxone: R >32    -  Ciprofloxacin: R >2    -  Nitrofurantoin: S <=32    -  Piperacillin/Tazobactam: R <=16    -  Tobramycin: R >8    -  Amikacin: S <=16    -  Gentamicin: S <=4    -  Imipenem: S <=1    -  Trimethoprim/Sulfamethoxazole: R >2/38    Specimen Source: .Urine Catheterized    Culture Results:   >100,000 CFU/ml Escherichia coli ESBL  Result called to and read back by_ Ms. LUCY Cohen RN  07/17/2017 12:56:02    Organism Identification: Escherichia coli ESBL    Organism: Escherichia coli ESBL    Method Type: ALTON INTERVAL HPI/OVERNIGHT EVENTS: Afebrile.     SUBJECTIVE: Patient seen and examined at bedside. Patient reports some mild penile tenderness, but improved from prior days. Notes some softening of the phallus. Patient notes that he's upset that he's still in the hospital and without having information about what the next steps are despite having information given by other providers.     VITAL SIGNS:  T(C): 36.7 (19 Jul 2017 14:24), Max: 37.4 (18 Jul 2017 20:05)  T(F): 98 (19 Jul 2017 14:24), Max: 99.3 (18 Jul 2017 20:05)  HR: 86 (19 Jul 2017 14:24) (74 - 89)  BP: 127/80 (19 Jul 2017 14:24) (118/72 - 139/77)  RR: 18 (19 Jul 2017 14:24) (15 - 18)  SpO2: 96% (19 Jul 2017 14:24) (95% - 100%)    PHYSICAL EXAM:  Constitutional: well-developed, well-nourished young male, NAD  HEENT: NCAT, EOMI  : erect penis with some softening since last exam, bruising along shaft, mild tenderness to palpation, blood on dressing at the meatus, no pus, scrotum non-tender, no lesions appreciated. Condon in place   Ext: WWP, no LE edema    Psych: angry and occasionally using curse words, perseverant on not having information regarding measures taken to resolve current problems    LABS:                        12.3   8.4   )-----------( 115      ( 19 Jul 2017 07:26 )             35.5     139  |  100  |  8   ----------------------------<  85  4.1   |  26  |  0.60    Ca    8.6      Phos  4.0     Mg     1.9        Culture - Blood (07.18.17 @ 17:52)    Specimen Source: .Blood None    Culture Results:   No growth at 12 hours    Culture - Blood (07.16.17 @ 14:13)    Gram Stain:   Growth in aerobic and anaerobic bottles: Gram Negative Rods  Result called to and read back byGilbert Cohen RN  07/17/2017 09:40:27    Specimen Source: .Blood Blood    Organism: Escherichia coli ESBL    Culture Results:   Growth in aerobic and anaerobic bottles: Escherichia coli ESBL  Floor previously notified.    Organism Identification: Escherichia coli ESBL    Method Type: ALTON    Culture - Blood (07.16.17 @ 00:45)    Gram Stain:   Anaerobic Bottle: Gram Negative Rods  Result called to and read back by_ KEVIN Gan RN  07/16/2017 13:23:13  Aerobic Bottle: Gram Negative Rods  Result called to and read back by_ RENAE Woo RN  07/16/2017 20:44:58  ***Blood Panel PCR results on thisspecimen are available  approximately 3 hours after the Gram stain result.***  Gram stain, PCR, and/or culture results may not always  correspond due to difference in methodologies.    -  Escherichia coli: Detec    Specimen Source: .Blood Blood    Organism: Blood Culture PCR    Organism: Escherichia coli ESBL    Culture Results:   Growth in aerobic and anaerobic bottles: Escherichia coli ESBL  See previous culture for susceptibility results    Organism Identification: Blood Culture PCR  Escherichia coli ESBL    Method Type: ALTON    Method Type: PCR    Culture - Urine (07.15.17 @ 18:46)    -  Ampicillin: R >16    -  Ampicillin/Sulbactam: R >16/8    -  Cefazolin: R >16    -  Ceftriaxone: R >32    -  Ciprofloxacin: R >2    -  Nitrofurantoin: S <=32    -  Piperacillin/Tazobactam: R <=16    -  Tobramycin: R >8    -  Amikacin: S <=16    -  Gentamicin: S <=4    -  Imipenem: S <=1    -  Trimethoprim/Sulfamethoxazole: R >2/38    Specimen Source: .Urine Catheterized    Culture Results:   >100,000 CFU/ml Escherichia coli ESBL  Result called to and read back by_ Ms. LUCY Cohen RN  07/17/2017 12:56:02    Organism Identification: Escherichia coli ESBL    Organism: Escherichia coli ESBL    Method Type: ALTON

## 2017-07-19 NOTE — PROGRESS NOTE ADULT - SUBJECTIVE AND OBJECTIVE BOX
AM NOTE    Patient is a 36y old  Male who presents with a chief complaint of pain, feverish (15 Jul 2017 18:36) s/p corpora cavernosa shunt 7/13    No acute events o/n        Vital Signs Last 24 Hrs  T(C): 37.4 (18 Jul 2017 20:05), Max: 37.4 (18 Jul 2017 20:05)  T(F): 99.3 (18 Jul 2017 20:05), Max: 99.3 (18 Jul 2017 20:05)  HR: 89 (18 Jul 2017 20:05) (75 - 89)  BP: 136/80 (18 Jul 2017 20:05) (118/72 - 136/80)  BP(mean): --  RR: 16 (18 Jul 2017 20:05) (14 - 16)  SpO2: 100% (18 Jul 2017 20:05) (98% - 100%)    I&O's Summary    17 Jul 2017 07:01  -  18 Jul 2017 07:00  --------------------------------------------------------  IN: 1040 mL / OUT: 3500 mL / NET: -2460 mL    18 Jul 2017 07:01  -  19 Jul 2017 05:17  --------------------------------------------------------  IN: 2080 mL / OUT: 3300 mL / NET: -1220 mL        Gen: NAD    Abd: soft, NTND    : +erection, +blood at meatus, bruising along shaft improving, FC intact and draining well,. scrotum NTTP                           11.3   7.2   )-----------( 93       ( 18 Jul 2017 05:45 )             33.4     07-18    140  |  102  |  9   ----------------------------<  90  3.7   |  27  |  0.60    Ca    8.2<L>      18 Jul 2017 05:45  Phos  3.3     07-18  Mg     1.8     07-18      cultures    A/P  36M with ESBL e coli in blood and urine after shunt  -cont abx  -trend fever curve  -diet: reg  -f/u psych consult and recs  -dvt ppx  -scrotal support  -OOB/IS  -pain meds PRN

## 2017-07-19 NOTE — BEHAVIORAL HEALTH ASSESSMENT NOTE - SUMMARY
36M history of methamphetamine use disorder, prior outpatient dual diagnosis treatment, reporting frustration and worry in setting of priapism and infection.  Patient has several concrete realistic concerns about his medical condition and plan for treatment, especially considering he is a [place] native with no health insurance.  Patient also at particular risk for feeling abandoned or victimized given his family history and upbringing.  Patient does not meet criteria at this time for major depression and would defer psychopharmacological intervention for now.

## 2017-07-19 NOTE — PROGRESS NOTE ADULT - ATTENDING COMMENTS
Pt seen and examined with the ID service Resident  Est 1 hour spent with the patient providing explanation and support and discussing his situation especially infectious problems.  Pt was very angry and frustrated but calmed down at the conclusion of the conversation

## 2017-07-19 NOTE — PROGRESS NOTE ADULT - ASSESSMENT
36M with PMHx of illicit drug use, admitted last week for priapism for over 24 hours after self-injecting Trimix s/p corpora cavernosa shunt on 7/13 then discharge the next day, returns with urosepsis and septicemia 2/2 ESBL E. coli.      #ESBL E. coli urosepsis with septicemia: Patient presents 1 day after discharge from [facility] for management of priapism with corpora cavernosa shunt on 7/13. On presentation, patient septic (febrile, tachycardic, +leukocytosis). Started on Vanc and Zosyn. Urine and blood cultures growing ESBL E. coli so patient switched to Meropenem (7/17 - ) and continued on Vancomycin for possible superimposed soft tissue infection in the setting of manipulation.   - continue Meropenem 1g Q8hrs (7/17 - )  - continue Vancomycin 1250mg Q12hrs (7/17 - ), obtain trough prior to 4th dose   - recommend that patient be continued on IV antibiotics for at least 2 weeks given the severe infection that he presented with. If patient agreeable, prefer that patient remain in the hospital for the course of his IV antibiotics as he'll have the greatest supervision here. However, if patient wants to be transferred to another facility, then strongly recommend that patient be sent to a facility where he'll have comparable follow up to ensure that his current medical problems (including possible cellulitis and bacteremia) are appropriately treated.  - follow up surveillance cultures     ID will follow. Discussed with Dr. Juárez and primary team. 36M with PMHx of illicit drug use, admitted last week for priapism for over 24 hours after self-injecting Trimix s/p corpora cavernosa shunt on 7/13 then discharge the next day, returns with urosepsis and septicemia 2/2 ESBL E. coli.      #ESBL E. coli urosepsis with septicemia: Patient presents 1 day after discharge from [facility] for management of priapism with corpora cavernosa shunt on 7/13. On presentation, patient septic (febrile, tachycardic, +leukocytosis). Started on Vanc and Zosyn. Urine and blood cultures growing ESBL E. coli so patient switched to Meropenem (7/17 - ) and continued on Vancomycin for possible superimposed soft tissue infection in the setting of manipulation.   - continue Meropenem 1g Q8hrs (7/17 - )  - continue Vancomycin 1250mg Q12hrs (7/17 - ), obtain trough prior to 4th dose   - recommend that patient be continued on IV antibiotics for at least 2 weeks given the severe infection that he presented with. If patient agreeable, prefer that patient remain in the hospital for the course of his IV antibiotics as he'll have the greatest supervision here. However, if patient wants to be transferred to another facility, then strongly recommend that patient be sent to a facility where he'll have comparable follow up to ensure that his current medical problems (including possible cellulitis and GN bacteremia) are appropriately treated.  - follow up surveillance cultures     ID will follow. Discussed with Dr. Juárez and primary team.

## 2017-07-19 NOTE — BEHAVIORAL HEALTH ASSESSMENT NOTE - NSBHSOCIALHXDETAILSFT_PSY_A_CORE
Spoke at length about family in [place], ambivalent about homosexuality, family Hoahaoism, previously living in [place] and [place] but now living with parents for last year in [place], unemployed.  Feels betrayed by family especially mother who have kept secrets from him about their own indiscretions while seemingly judging him for poor behavior.

## 2017-07-19 NOTE — BEHAVIORAL HEALTH ASSESSMENT NOTE - RISK ASSESSMENT
Patient at chronically elevated risk of danger to self given reports of chronic suicidal thoughts but no acutely elevated risk at this time.

## 2017-07-20 DIAGNOSIS — A49.9 BACTERIAL INFECTION, UNSPECIFIED: ICD-10-CM

## 2017-07-20 LAB
ANION GAP SERPL CALC-SCNC: 9 MMOL/L — SIGNIFICANT CHANGE UP (ref 5–17)
BASOPHILS NFR BLD AUTO: 1.2 % — SIGNIFICANT CHANGE UP (ref 0–2)
BUN SERPL-MCNC: 10 MG/DL — SIGNIFICANT CHANGE UP (ref 7–23)
CALCIUM SERPL-MCNC: 8.6 MG/DL — SIGNIFICANT CHANGE UP (ref 8.4–10.5)
CHLORIDE SERPL-SCNC: 98 MMOL/L — SIGNIFICANT CHANGE UP (ref 96–108)
CO2 SERPL-SCNC: 30 MMOL/L — SIGNIFICANT CHANGE UP (ref 22–31)
CREAT SERPL-MCNC: 0.8 MG/DL — SIGNIFICANT CHANGE UP (ref 0.5–1.3)
EOSINOPHIL NFR BLD AUTO: 2 % — SIGNIFICANT CHANGE UP (ref 0–6)
GLUCOSE SERPL-MCNC: 120 MG/DL — HIGH (ref 70–99)
HCT VFR BLD CALC: 39.5 % — SIGNIFICANT CHANGE UP (ref 39–50)
HGB BLD-MCNC: 12.7 G/DL — LOW (ref 13–17)
LYMPHOCYTES # BLD AUTO: 25.2 % — SIGNIFICANT CHANGE UP (ref 13–44)
MAGNESIUM SERPL-MCNC: 2.1 MG/DL — SIGNIFICANT CHANGE UP (ref 1.6–2.6)
MCHC RBC-ENTMCNC: 28.2 PG — SIGNIFICANT CHANGE UP (ref 27–34)
MCHC RBC-ENTMCNC: 32.2 G/DL — SIGNIFICANT CHANGE UP (ref 32–36)
MCV RBC AUTO: 87.6 FL — SIGNIFICANT CHANGE UP (ref 80–100)
MONOCYTES NFR BLD AUTO: 14.8 % — HIGH (ref 2–14)
NEUTROPHILS NFR BLD AUTO: 56.8 % — SIGNIFICANT CHANGE UP (ref 43–77)
PHOSPHATE SERPL-MCNC: 3 MG/DL — SIGNIFICANT CHANGE UP (ref 2.5–4.5)
PLATELET # BLD AUTO: 160 K/UL — SIGNIFICANT CHANGE UP (ref 150–400)
POTASSIUM SERPL-MCNC: 4.1 MMOL/L — SIGNIFICANT CHANGE UP (ref 3.5–5.3)
POTASSIUM SERPL-SCNC: 4.1 MMOL/L — SIGNIFICANT CHANGE UP (ref 3.5–5.3)
RBC # BLD: 4.51 M/UL — SIGNIFICANT CHANGE UP (ref 4.2–5.8)
RBC # FLD: 14.3 % — SIGNIFICANT CHANGE UP (ref 10.3–16.9)
SODIUM SERPL-SCNC: 137 MMOL/L — SIGNIFICANT CHANGE UP (ref 135–145)
VANCOMYCIN TROUGH SERPL-MCNC: 7.2 UG/ML — LOW (ref 10–20)
WBC # BLD: 11.2 K/UL — HIGH (ref 3.8–10.5)
WBC # FLD AUTO: 11.2 K/UL — HIGH (ref 3.8–10.5)

## 2017-07-20 RX ORDER — LIDOCAINE 4 G/100G
1 CREAM TOPICAL
Qty: 0 | Refills: 0 | Status: DISCONTINUED | OUTPATIENT
Start: 2017-07-20 | End: 2017-07-30

## 2017-07-20 RX ADMIN — Medication 400 MILLIGRAM(S): at 18:37

## 2017-07-20 RX ADMIN — OXYCODONE AND ACETAMINOPHEN 1 TABLET(S): 5; 325 TABLET ORAL at 16:06

## 2017-07-20 RX ADMIN — MEROPENEM 100 MILLIGRAM(S): 1 INJECTION INTRAVENOUS at 22:09

## 2017-07-20 RX ADMIN — OXYCODONE AND ACETAMINOPHEN 1 TABLET(S): 5; 325 TABLET ORAL at 07:27

## 2017-07-20 RX ADMIN — OXYCODONE AND ACETAMINOPHEN 1 TABLET(S): 5; 325 TABLET ORAL at 13:05

## 2017-07-20 RX ADMIN — Medication 400 MILLIGRAM(S): at 06:10

## 2017-07-20 RX ADMIN — Medication 400 MILLIGRAM(S): at 00:17

## 2017-07-20 RX ADMIN — HEPARIN SODIUM 5000 UNIT(S): 5000 INJECTION INTRAVENOUS; SUBCUTANEOUS at 17:37

## 2017-07-20 RX ADMIN — Medication 400 MILLIGRAM(S): at 14:05

## 2017-07-20 RX ADMIN — Medication 400 MILLIGRAM(S): at 17:37

## 2017-07-20 RX ADMIN — MEROPENEM 100 MILLIGRAM(S): 1 INJECTION INTRAVENOUS at 13:06

## 2017-07-20 RX ADMIN — Medication 400 MILLIGRAM(S): at 13:05

## 2017-07-20 RX ADMIN — Medication 100 MILLIGRAM(S): at 06:10

## 2017-07-20 RX ADMIN — OXYCODONE AND ACETAMINOPHEN 1 TABLET(S): 5; 325 TABLET ORAL at 17:06

## 2017-07-20 RX ADMIN — Medication 300 MILLIGRAM(S): at 15:52

## 2017-07-20 RX ADMIN — OXYCODONE AND ACETAMINOPHEN 1 TABLET(S): 5; 325 TABLET ORAL at 22:09

## 2017-07-20 RX ADMIN — MEROPENEM 100 MILLIGRAM(S): 1 INJECTION INTRAVENOUS at 06:11

## 2017-07-20 RX ADMIN — OXYCODONE AND ACETAMINOPHEN 1 TABLET(S): 5; 325 TABLET ORAL at 14:05

## 2017-07-20 RX ADMIN — OXYCODONE AND ACETAMINOPHEN 1 TABLET(S): 5; 325 TABLET ORAL at 23:05

## 2017-07-20 RX ADMIN — HEPARIN SODIUM 5000 UNIT(S): 5000 INJECTION INTRAVENOUS; SUBCUTANEOUS at 06:10

## 2017-07-20 NOTE — PROGRESS NOTE ADULT - SUBJECTIVE AND OBJECTIVE BOX
INTERVAL HPI/OVERNIGHT EVENTS:  No acute events overnight.    VITALS:    T(F): 97.8 (07-20-17 @ 05:43), Max: 99.3 (07-19-17 @ 17:35)  HR: 72 (07-20-17 @ 05:43) (72 - 99)  BP: 119/74 (07-20-17 @ 05:43) (119/74 - 127/80)  RR: 16 (07-20-17 @ 05:43) (15 - 18)  SpO2: 98% (07-20-17 @ 05:43) (96% - 99%)  Wt(kg): --    I&O's Detail    18 Jul 2017 07:01  -  19 Jul 2017 07:00  --------------------------------------------------------  IN:    Oral Fluid: 1780 mL    Solution: 250 mL    Solution: 50 mL  Total IN: 2080 mL    OUT:    Indwelling Catheter - Urethral: 4900 mL  Total OUT: 4900 mL    Total NET: -2820 mL      19 Jul 2017 07:01  -  20 Jul 2017 06:25  --------------------------------------------------------  IN:    Solution: 100 mL    Solution: 500 mL  Total IN: 600 mL    OUT:    Indwelling Catheter - Urethral: 5200 mL  Total OUT: 5200 mL    Total NET: -4600 mL          MEDICATIONS:    ANTIBIOTICS:  meropenem IVPB 1000 milliGRAM(s) IV Intermittent every 8 hours  vancomycin  IVPB 1500 milliGRAM(s) IV Intermittent every 12 hours      PAIN CONTROL:  acetaminophen   Tablet 650 milliGRAM(s) Oral every 6 hours PRN  acetaminophen   Tablet. 650 milliGRAM(s) Oral every 6 hours PRN  ondansetron Injectable 4 milliGRAM(s) IV Push every 6 hours PRN  oxyCODONE    5 mG/acetaminophen 325 mG 1 Tablet(s) Oral every 4 hours PRN  morphine  - Injectable 4 milliGRAM(s) IV Push every 6 hours PRN  ibuprofen  Tablet 400 milliGRAM(s) Oral every 6 hours       MEDS:      HEME/ONC  heparin  Injectable 5000 Unit(s) SubCutaneous every 12 hours        PHYSICAL EXAM:  General: No acute distress.  Alert and Oriented  Abdominal Exam:   Exam: +erection +jose catheter intact +dry blood at meatus noted +ecchmosis shaft penis TTP, NT scrotum      LABS:                        12.3   8.4   )-----------( 115      ( 19 Jul 2017 07:26 )             35.5     07-19    139  |  100  |  8   ----------------------------<  85  4.1   |  26  |  0.60    Ca    8.6      19 Jul 2017 07:26  Phos  4.0     07-19  Mg     1.9     07-19            RADIOLOGY & ADDITIONAL TESTS: INTERVAL HPI/OVERNIGHT EVENTS:  No acute events overnight. c/o some discomfort penis.     VITALS:    T(F): 97.8 (07-20-17 @ 05:43), Max: 99.3 (07-19-17 @ 17:35)  HR: 72 (07-20-17 @ 05:43) (72 - 99)  BP: 119/74 (07-20-17 @ 05:43) (119/74 - 127/80)  RR: 16 (07-20-17 @ 05:43) (15 - 18)  SpO2: 98% (07-20-17 @ 05:43) (96% - 99%)  Wt(kg): --    I&O's Detail    18 Jul 2017 07:01  -  19 Jul 2017 07:00  --------------------------------------------------------  IN:    Oral Fluid: 1780 mL    Solution: 250 mL    Solution: 50 mL  Total IN: 2080 mL    OUT:    Indwelling Catheter - Urethral: 4900 mL  Total OUT: 4900 mL    Total NET: -2820 mL      19 Jul 2017 07:01  -  20 Jul 2017 06:25  --------------------------------------------------------  IN:    Solution: 100 mL    Solution: 500 mL  Total IN: 600 mL    OUT:    Indwelling Catheter - Urethral: 5200 mL  Total OUT: 5200 mL    Total NET: -4600 mL          MEDICATIONS:    ANTIBIOTICS:  meropenem IVPB 1000 milliGRAM(s) IV Intermittent every 8 hours  vancomycin  IVPB 1500 milliGRAM(s) IV Intermittent every 12 hours      PAIN CONTROL:  acetaminophen   Tablet 650 milliGRAM(s) Oral every 6 hours PRN  acetaminophen   Tablet. 650 milliGRAM(s) Oral every 6 hours PRN  ondansetron Injectable 4 milliGRAM(s) IV Push every 6 hours PRN  oxyCODONE    5 mG/acetaminophen 325 mG 1 Tablet(s) Oral every 4 hours PRN  morphine  - Injectable 4 milliGRAM(s) IV Push every 6 hours PRN  ibuprofen  Tablet 400 milliGRAM(s) Oral every 6 hours       MEDS:      HEME/ONC  heparin  Injectable 5000 Unit(s) SubCutaneous every 12 hours        PHYSICAL EXAM:  General: No acute distress.  Alert and Oriented  Abdominal Exam:   Exam: +erection +jose catheter intact +dry blood at meatus noted +ecchymosis shaft penis improved, sl TTP, NT scrotum      LABS:                        12.3   8.4   )-----------( 115      ( 19 Jul 2017 07:26 )             35.5     07-19    139  |  100  |  8   ----------------------------<  85  4.1   |  26  |  0.60    Ca    8.6      19 Jul 2017 07:26  Phos  4.0     07-19  Mg     1.9     07-19            RADIOLOGY & ADDITIONAL TESTS:

## 2017-07-20 NOTE — PROGRESS NOTE ADULT - SUBJECTIVE AND OBJECTIVE BOX
INTERVAL HPI/OVERNIGHT EVENTS: Afebrile, WBC uptrending.    SUBJECTIVE: Patient seen and examined at bedside. Patient notes continued softening of erection. Notes a few episodes of loose/watery diarrhea this morning, non bloody, no abdominal pain, no nausea.     VITAL SIGNS:  T(C): 36.1 (20 Jul 2017 10:16), Max: 37.4 (19 Jul 2017 17:35)  T(F): 97 (20 Jul 2017 10:16), Max: 99.3 (19 Jul 2017 17:35)  HR: 73 (20 Jul 2017 10:16) (72 - 99)  BP: 133/84 (20 Jul 2017 10:16) (119/74 - 133/84)  RR: 16 (20 Jul 2017 10:16) (16 - 17)  SpO2: 98% (20 Jul 2017 10:16) (98% - 99%)    PHYSICAL EXAM:  Constitutional: well-developed, well-nourished young male, NAD  HEENT: NCAT, EOMI  GI: soft, nondistended   : erect penis with some softening since last exam, bruising along shaft, mild tenderness to palpation, blood on dressing at the meatus, no pus, scrotum non-tender, no lesions appreciated. Condon in place   Ext: WWP, no LE edema    Neuro: awake and alert, following commands, answers questions appropriately     LABS:                        12.7   11.2  )-----------( 160      ( 20 Jul 2017 06:55 )             39.5     137  |  98  |  10  ----------------------------<  120<H>  4.1   |  30  |  0.80    Ca    8.6      Phos  3.0     Mg     2.1         Culture - Blood (07.18.17 @ 17:52)    Specimen Source: .Blood None    Culture Results:   No growth at 1 day.    Culture - Blood (07.16.17 @ 14:13)    Gram Stain:   Growth in aerobic and anaerobic bottles: Gram Negative Rods  Result called to and read back byGilbert Cohen RN  07/17/2017 09:40:27    Specimen Source: .Blood Blood    Organism: Escherichia coli ESBL    Culture Results:   Growth in aerobic and anaerobic bottles: Escherichia coli ESBL  Floor previously notified.    Organism Identification: Escherichia coli ESBL    Method Type: ALTON

## 2017-07-20 NOTE — DIETITIAN INITIAL EVALUATION ADULT. - ENERGY NEEDS
Height: 75" Weight: 179lbs, IBW 196lbs+/-10%, %IBW 91%, BMI - 22.5  ABW used to calculate energy needs due to pt's current body weight within % IBW   Nutrient needs based on [facility] standards of care for maintenance in adults.

## 2017-07-20 NOTE — PROGRESS NOTE ADULT - ASSESSMENT
36M with PMHx of illicit drug use, admitted last week for priapism for over 24 hours after self-injecting Trimix s/p corpora cavernosa shunt on 7/13 then discharge the next day, returns with urosepsis and septicemia 2/2 ESBL E. coli.      #ESBL E. coli urosepsis with septicemia: Patient presents 1 day after discharge from [facility] for management of priapism with corpora cavernosa shunt on 7/13. On presentation, patient septic (febrile, tachycardic, +leukocytosis). Started on Vanc and Zosyn. Urine and blood cultures growing ESBL E. coli so patient switched to Meropenem (7/17 - ) and continued on Vancomycin for possible superimposed soft tissue infection in the setting of manipulation.   - continue Meropenem 1g Q8hrs (7/17 - )  - continue Vancomycin 1250mg Q12hrs (7/17 - ), obtain trough prior to 4th dose   - recommend that patient be continued on IV antibiotics for at least 2 weeks given the severe infection that he presented with. If patient agreeable, prefer that patient remain in the hospital for the course of his IV antibiotics as he'll have the greatest supervision here. However, if patient wants to be transferred to another facility, then strongly recommend that patient be sent to a facility where he'll have comparable follow up to ensure that his current medical problems (including possible cellulitis and GN bacteremia) are appropriately treated.  - follow up surveillance cultures     #Diarrhea: reports new onset diarrhea x few episodes this morning, loose/watery, nonbloody. Labs also significant for uptrending WBC. Possible that diarrhea is medication induced as antibiotics can often cause loose stools. However, cannot rule out C. diff in this patient.   - recommend sending C. diff studies   - isolation precautions pending results     ID will follow. Discussed with Dr. Juárez and primary team.

## 2017-07-20 NOTE — DIETITIAN INITIAL EVALUATION ADULT. - OTHER INFO
35 yo male s/p corpora cavernosa shunt, urethral injury for priapism 7/13/17; admitted for fever and pain: +ESBL ecoli. Pt tolerating regular diet with good PO intake. Pt deneis any recent wt changes. Pain is controlled with pain meds. Pt denies any n/v, but is currently c/o diarrhea, which could possible be related to antibiotics. Encouraged probiotic rich foods, pt agreed to try yogurt with meals. Also rec. lactobacillus supplements. NKFA.

## 2017-07-20 NOTE — PROGRESS NOTE ADULT - ATTENDING COMMENTS
Pt seen and examined  COllect stool for Cdiff  Continue IV abx Pt seen and examined  Collect stool for Cdiff  Continue IV abx

## 2017-07-20 NOTE — PROGRESS NOTE ADULT - ASSESSMENT
35 yo male s/p corpora cavernosa shunt, urethral injury for priapism 7/13/17; admitted for fever and pain: +ESBL ecoli

## 2017-07-20 NOTE — DIETITIAN INITIAL EVALUATION ADULT. - NS AS NUTRI INTERV ED CONTENT2
Educated on general, healthy eating. Encouraged probiotic rich foods to help prevent antibiotics induced diarrhea./Purpose of the nutrition education

## 2017-07-21 LAB
ANION GAP SERPL CALC-SCNC: 11 MMOL/L — SIGNIFICANT CHANGE UP (ref 5–17)
APPEARANCE UR: CLEAR — SIGNIFICANT CHANGE UP
BASOPHILS NFR BLD AUTO: 1 % — SIGNIFICANT CHANGE UP (ref 0–2)
BILIRUB UR-MCNC: NEGATIVE — SIGNIFICANT CHANGE UP
BUN SERPL-MCNC: 6 MG/DL — LOW (ref 7–23)
CALCIUM SERPL-MCNC: 8.7 MG/DL — SIGNIFICANT CHANGE UP (ref 8.4–10.5)
CHLORIDE SERPL-SCNC: 98 MMOL/L — SIGNIFICANT CHANGE UP (ref 96–108)
CO2 SERPL-SCNC: 26 MMOL/L — SIGNIFICANT CHANGE UP (ref 22–31)
COLOR SPEC: YELLOW — SIGNIFICANT CHANGE UP
CREAT SERPL-MCNC: 0.6 MG/DL — SIGNIFICANT CHANGE UP (ref 0.5–1.3)
DIFF PNL FLD: (no result)
EOSINOPHIL NFR BLD AUTO: 5 % — SIGNIFICANT CHANGE UP (ref 0–6)
GLUCOSE SERPL-MCNC: 114 MG/DL — HIGH (ref 70–99)
GLUCOSE UR QL: NEGATIVE — SIGNIFICANT CHANGE UP
HCT VFR BLD CALC: 40.2 % — SIGNIFICANT CHANGE UP (ref 39–50)
HGB BLD-MCNC: 13.1 G/DL — SIGNIFICANT CHANGE UP (ref 13–17)
KETONES UR-MCNC: NEGATIVE — SIGNIFICANT CHANGE UP
LEUKOCYTE ESTERASE UR-ACNC: NEGATIVE — SIGNIFICANT CHANGE UP
LYMPHOCYTES # BLD AUTO: 24 % — SIGNIFICANT CHANGE UP (ref 13–44)
MAGNESIUM SERPL-MCNC: 2.1 MG/DL — SIGNIFICANT CHANGE UP (ref 1.6–2.6)
MCHC RBC-ENTMCNC: 28.5 PG — SIGNIFICANT CHANGE UP (ref 27–34)
MCHC RBC-ENTMCNC: 32.6 G/DL — SIGNIFICANT CHANGE UP (ref 32–36)
MCV RBC AUTO: 87.4 FL — SIGNIFICANT CHANGE UP (ref 80–100)
MONOCYTES NFR BLD AUTO: 3 % — SIGNIFICANT CHANGE UP (ref 2–14)
NEUTROPHILS NFR BLD AUTO: 57 % — SIGNIFICANT CHANGE UP (ref 43–77)
NITRITE UR-MCNC: NEGATIVE — SIGNIFICANT CHANGE UP
PH UR: 7 — SIGNIFICANT CHANGE UP (ref 5–8)
PHOSPHATE SERPL-MCNC: 3.5 MG/DL — SIGNIFICANT CHANGE UP (ref 2.5–4.5)
PLATELET # BLD AUTO: 228 K/UL — SIGNIFICANT CHANGE UP (ref 150–400)
POTASSIUM SERPL-MCNC: 4.2 MMOL/L — SIGNIFICANT CHANGE UP (ref 3.5–5.3)
POTASSIUM SERPL-SCNC: 4.2 MMOL/L — SIGNIFICANT CHANGE UP (ref 3.5–5.3)
PROT UR-MCNC: NEGATIVE MG/DL — SIGNIFICANT CHANGE UP
RBC # BLD: 4.6 M/UL — SIGNIFICANT CHANGE UP (ref 4.2–5.8)
RBC # FLD: 14.3 % — SIGNIFICANT CHANGE UP (ref 10.3–16.9)
SODIUM SERPL-SCNC: 135 MMOL/L — SIGNIFICANT CHANGE UP (ref 135–145)
SP GR SPEC: 1.01 — SIGNIFICANT CHANGE UP (ref 1–1.03)
UROBILINOGEN FLD QL: 1 E.U./DL — SIGNIFICANT CHANGE UP
VANCOMYCIN TROUGH SERPL-MCNC: 8.1 UG/ML — LOW (ref 10–20)
WBC # BLD: 12.5 K/UL — HIGH (ref 3.8–10.5)
WBC # FLD AUTO: 12.5 K/UL — HIGH (ref 3.8–10.5)

## 2017-07-21 PROCEDURE — 99233 SBSQ HOSP IP/OBS HIGH 50: CPT

## 2017-07-21 RX ORDER — VANCOMYCIN HCL 1 G
1750 VIAL (EA) INTRAVENOUS EVERY 12 HOURS
Qty: 0 | Refills: 0 | Status: DISCONTINUED | OUTPATIENT
Start: 2017-07-21 | End: 2017-07-30

## 2017-07-21 RX ADMIN — Medication 650 MILLIGRAM(S): at 17:55

## 2017-07-21 RX ADMIN — OXYCODONE AND ACETAMINOPHEN 1 TABLET(S): 5; 325 TABLET ORAL at 16:24

## 2017-07-21 RX ADMIN — OXYCODONE AND ACETAMINOPHEN 1 TABLET(S): 5; 325 TABLET ORAL at 05:23

## 2017-07-21 RX ADMIN — Medication 400 MILLIGRAM(S): at 05:23

## 2017-07-21 RX ADMIN — Medication 250 MILLIGRAM(S): at 17:52

## 2017-07-21 RX ADMIN — HEPARIN SODIUM 5000 UNIT(S): 5000 INJECTION INTRAVENOUS; SUBCUTANEOUS at 05:23

## 2017-07-21 RX ADMIN — Medication 400 MILLIGRAM(S): at 14:11

## 2017-07-21 RX ADMIN — OXYCODONE AND ACETAMINOPHEN 1 TABLET(S): 5; 325 TABLET ORAL at 15:24

## 2017-07-21 RX ADMIN — MEROPENEM 100 MILLIGRAM(S): 1 INJECTION INTRAVENOUS at 23:08

## 2017-07-21 RX ADMIN — HEPARIN SODIUM 5000 UNIT(S): 5000 INJECTION INTRAVENOUS; SUBCUTANEOUS at 17:52

## 2017-07-21 RX ADMIN — Medication 300 MILLIGRAM(S): at 03:00

## 2017-07-21 RX ADMIN — MORPHINE SULFATE 4 MILLIGRAM(S): 50 CAPSULE, EXTENDED RELEASE ORAL at 18:10

## 2017-07-21 RX ADMIN — Medication 400 MILLIGRAM(S): at 17:52

## 2017-07-21 RX ADMIN — Medication 400 MILLIGRAM(S): at 01:19

## 2017-07-21 RX ADMIN — Medication 400 MILLIGRAM(S): at 13:11

## 2017-07-21 RX ADMIN — Medication 400 MILLIGRAM(S): at 00:19

## 2017-07-21 RX ADMIN — MORPHINE SULFATE 4 MILLIGRAM(S): 50 CAPSULE, EXTENDED RELEASE ORAL at 01:54

## 2017-07-21 RX ADMIN — MEROPENEM 100 MILLIGRAM(S): 1 INJECTION INTRAVENOUS at 13:11

## 2017-07-21 RX ADMIN — Medication 400 MILLIGRAM(S): at 06:14

## 2017-07-21 RX ADMIN — Medication 400 MILLIGRAM(S): at 19:00

## 2017-07-21 RX ADMIN — MEROPENEM 100 MILLIGRAM(S): 1 INJECTION INTRAVENOUS at 05:24

## 2017-07-21 RX ADMIN — OXYCODONE AND ACETAMINOPHEN 1 TABLET(S): 5; 325 TABLET ORAL at 06:14

## 2017-07-21 RX ADMIN — MORPHINE SULFATE 4 MILLIGRAM(S): 50 CAPSULE, EXTENDED RELEASE ORAL at 17:55

## 2017-07-21 RX ADMIN — MORPHINE SULFATE 4 MILLIGRAM(S): 50 CAPSULE, EXTENDED RELEASE ORAL at 02:21

## 2017-07-21 NOTE — PROGRESS NOTE ADULT - ASSESSMENT
36M with PMHx of illicit drug use, admitted last week for priapism for over 24 hours after self-injecting Trimix s/p corpora cavernosa shunt on 7/13 then discharge the next day, returns with urosepsis and septicemia 2/2 ESBL E. coli.      #ESBL E. coli urosepsis with septicemia: Patient presents 1 day after discharge from [facility] for management of priapism with corpora cavernosa shunt on 7/13. On presentation, patient septic (febrile, tachycardic, +leukocytosis). Started on Vanc and Zosyn. Urine and blood cultures growing ESBL E. coli so patient switched to Meropenem (7/17 - ) and continued on Vancomycin for possible superimposed soft tissue infection in the setting of manipulation.   - continue Meropenem 1g Q8hrs (7/17 - )  - continue Vancomycin 1250mg Q12hrs (7/17 - ), obtain trough prior to 4th dose   - recommend that patient be continued on IV antibiotics for at least 2 weeks given the severe infection that he presented with. If patient agreeable, prefer that patient remain in the hospital for the course of his IV antibiotics as he'll have the greatest supervision here. However, if patient wants to be transferred to another facility, then strongly recommend that patient be sent to a facility where he'll have comparable follow up to ensure that his current medical problems (including possible cellulitis and GN bacteremia) are appropriately treated.  - follow up surveillance cultures     #Leukocytosis with left shift: Labs remarkable for new uptrending WBC with L shift, concerning for possible new infectious process. At this time, etiology is unclear. Patient complaining of loose bowel movements, which could be concerning for C. diff or medication induced diarrhea. Would r/o C. diff. Patient also has tenderness to areas of previous IV sites, ?thrombophlebitis.   - recommend sending C. diff studies   - C. diff isolation precautions until negative studies   - send UA and UCx   - continue Vancomycin as above for possible thrombophlebitis    ID will follow. Discussed with Dr. Juárez and primary team.

## 2017-07-21 NOTE — PROGRESS NOTE ADULT - SUBJECTIVE AND OBJECTIVE BOX
INTERVAL HPI/OVERNIGHT EVENTS: DORIAN    SUBJECTIVE: Patient seen and examined at bedside. Patient reports still having loose bowel movements, most recently last night. Patient also complains of tenderness at previous IV site at RUE. Denies cough, URI symptoms, no abdominal pain or nausea.    VITAL SIGNS:  T(C): 36.7 (21 Jul 2017 13:17), Max: 36.9 (20 Jul 2017 17:25)  T(F): 98.1 (21 Jul 2017 13:17), Max: 98.5 (20 Jul 2017 17:25)  HR: 94 (21 Jul 2017 13:17) (73 - 94)  BP: 137/77 (21 Jul 2017 13:17) (118/74 - 137/77)  RR: 16 (21 Jul 2017 13:17) (16 - 18)  SpO2: 98% (21 Jul 2017 13:17) (96% - 99%)    PHYSICAL EXAM:  Constitutional: well-developed, well-nourished young male, NAD  HEENT: NCAT, EOMI  Skin: RUE -- tenderness to palpation at areas of previous IV, no significant erythema, some swelling   GI: soft, nondistended, nontender  : erect penis with continued softening since last exam, bruising along shaft, mild tenderness to palpation, no blood on dressing at the meatus, no pus, scrotum non-tender, no lesions appreciated. Condon in place   Ext: WWP, no LE edema    Neuro: awake and alert, following commands, answers questions appropriately     LABS:                        13.1   12.5  )-----------( 228      ( 21 Jul 2017 07:13 )             40.2     135  |  98  |  6<L>  ----------------------------<  114<H>  4.2   |  26  |  0.60    Ca    8.7      Phos  3.5     Mg     2.1         Culture - Blood (07.18.17 @ 17:52)    Specimen Source: .Blood None    Culture Results:   No growth at 2 days.    Culture - Blood (07.16.17 @ 14:13)    Gram Stain:   Growth in aerobic and anaerobic bottles: Gram Negative Rods  Result called to and read back byGilbert Cohen RN  07/17/2017 09:40:27    Specimen Source: .Blood Blood    Organism: Escherichia coli ESBL    Culture Results:   Growth in aerobic and anaerobic bottles: Escherichia coli ESBL  Floor previously notified.    Organism Identification: Escherichia coli ESBL    Method Type: ALTON    Culture - Blood (07.16.17 @ 00:45)    Gram Stain:   Anaerobic Bottle: Gram Negative Rods  Result called to and read back by_ KEVIN Gan RN  07/16/2017 13:23:13  Aerobic Bottle: Gram Negative Rods  Result called to and read back by_ RENAE Woo RN  07/16/2017 20:44:58  ***Blood Panel PCR results on thisspecimen are available  approximately 3 hours after the Gram stain result.***  Gram stain, PCR, and/or culture results may not always  correspond due to difference in methodologies.    -  Escherichia coli: Detec    Specimen Source: .Blood Blood    Organism: Blood Culture PCR    Organism: Escherichia coli ESBL    Culture Results:   Growth in aerobic and anaerobic bottles: Escherichia coli ESBL  See previous culture for susceptibility results    Organism Identification: Blood Culture PCR  Escherichia coli ESBL    Method Type: ALTON    Method Type: PCR    Culture - Urine (07.15.17 @ 18:46)    -  Ampicillin: R >16    -  Ampicillin/Sulbactam: R >16/8    -  Cefazolin: R >16    -  Ceftriaxone: R >32    -  Ciprofloxacin: R >2    -  Nitrofurantoin: S <=32    -  Piperacillin/Tazobactam: R <=16    -  Tobramycin: R >8    -  Amikacin: S <=16    -  Gentamicin: S <=4    -  Imipenem: S <=1    -  Trimethoprim/Sulfamethoxazole: R >2/38    Specimen Source: .Urine Catheterized    Culture Results:   >100,000 CFU/ml Escherichia coli ESBL  Result called to and read back by_ Ms. LUCY Cohen RN  07/17/2017 12:56:02    Organism Identification: Escherichia coli ESBL    Organism: Escherichia coli ESBL    Method Type: ALTON

## 2017-07-21 NOTE — PROGRESS NOTE ADULT - SUBJECTIVE AND OBJECTIVE BOX
AM NOTE    Patient is a 36y old  Male who presents with a chief complaint of pain, feverish (15 Jul 2017 18:36)     No acute events o/n        Vital Signs Last 24 Hrs  T(C): 36.3 (20 Jul 2017 20:50), Max: 36.9 (20 Jul 2017 17:25)  T(F): 97.3 (20 Jul 2017 20:50), Max: 98.5 (20 Jul 2017 17:25)  HR: 73 (20 Jul 2017 20:50) (72 - 87)  BP: 125/74 (20 Jul 2017 20:50) (118/74 - 133/84)  BP(mean): --  RR: 16 (20 Jul 2017 20:50) (16 - 16)  SpO2: 98% (20 Jul 2017 20:50) (97% - 98%)    I&O's Summary    19 Jul 2017 07:01  -  20 Jul 2017 07:00  --------------------------------------------------------  IN: 1000 mL / OUT: 7400 mL / NET: -6400 mL    20 Jul 2017 07:01  -  21 Jul 2017 05:11  --------------------------------------------------------  IN: 1090 mL / OUT: 1400 mL / NET: -310 mL        Gen: NAD    Abd: soft, NTND    : +erection with some softening since last exam, +bruising along penis improving, +ttp along shaft, blood at meatus, +FC intact and draining, scrotum nttp                          12.7   11.2  )-----------( 160      ( 20 Jul 2017 06:55 )             39.5     07-20    137  |  98  |  10  ----------------------------<  120<H>  4.1   |  30  |  0.80    Ca    8.6      20 Jul 2017 06:55  Phos  3.0     07-20  Mg     2.1     07-20      cultures    A/P  36M with fever after corpora-cavernosal shunt 7/13  -cont abx (f/u VT)  -monitor UO  -OOB/IS  -diet: reg  -cont FC  -f/u with SW  -pain meds PRN

## 2017-07-21 NOTE — PROGRESS NOTE BEHAVIORAL HEALTH - NSBHCONSULTRECOMMENDOTHER_PSY_A_CORE FT
2. Would update patient on plan regularly  3. Patient was given contact information for outpatient psychiatry clinics at [place], [place], and [facility]

## 2017-07-22 DIAGNOSIS — N48.30 PRIAPISM, UNSPECIFIED: ICD-10-CM

## 2017-07-22 LAB
ANION GAP SERPL CALC-SCNC: 13 MMOL/L — SIGNIFICANT CHANGE UP (ref 5–17)
BASOPHILS NFR BLD AUTO: 0.5 % — SIGNIFICANT CHANGE UP (ref 0–2)
BUN SERPL-MCNC: 6 MG/DL — LOW (ref 7–23)
CALCIUM SERPL-MCNC: 8.5 MG/DL — SIGNIFICANT CHANGE UP (ref 8.4–10.5)
CHLORIDE SERPL-SCNC: 98 MMOL/L — SIGNIFICANT CHANGE UP (ref 96–108)
CO2 SERPL-SCNC: 26 MMOL/L — SIGNIFICANT CHANGE UP (ref 22–31)
CREAT SERPL-MCNC: 0.6 MG/DL — SIGNIFICANT CHANGE UP (ref 0.5–1.3)
EOSINOPHIL NFR BLD AUTO: 3 % — SIGNIFICANT CHANGE UP (ref 0–6)
GLUCOSE SERPL-MCNC: 137 MG/DL — HIGH (ref 70–99)
HCT VFR BLD CALC: 38.5 % — LOW (ref 39–50)
HGB BLD-MCNC: 12.4 G/DL — LOW (ref 13–17)
LYMPHOCYTES # BLD AUTO: 22.1 % — SIGNIFICANT CHANGE UP (ref 13–44)
MAGNESIUM SERPL-MCNC: 2.2 MG/DL — SIGNIFICANT CHANGE UP (ref 1.6–2.6)
MCHC RBC-ENTMCNC: 28.4 PG — SIGNIFICANT CHANGE UP (ref 27–34)
MCHC RBC-ENTMCNC: 32.2 G/DL — SIGNIFICANT CHANGE UP (ref 32–36)
MCV RBC AUTO: 88.1 FL — SIGNIFICANT CHANGE UP (ref 80–100)
MONOCYTES NFR BLD AUTO: 11.8 % — SIGNIFICANT CHANGE UP (ref 2–14)
NEUTROPHILS NFR BLD AUTO: 62.6 % — SIGNIFICANT CHANGE UP (ref 43–77)
PHOSPHATE SERPL-MCNC: 3.3 MG/DL — SIGNIFICANT CHANGE UP (ref 2.5–4.5)
PLATELET # BLD AUTO: 271 K/UL — SIGNIFICANT CHANGE UP (ref 150–400)
POTASSIUM SERPL-MCNC: 3.8 MMOL/L — SIGNIFICANT CHANGE UP (ref 3.5–5.3)
POTASSIUM SERPL-SCNC: 3.8 MMOL/L — SIGNIFICANT CHANGE UP (ref 3.5–5.3)
RBC # BLD: 4.37 M/UL — SIGNIFICANT CHANGE UP (ref 4.2–5.8)
RBC # FLD: 14.3 % — SIGNIFICANT CHANGE UP (ref 10.3–16.9)
SODIUM SERPL-SCNC: 137 MMOL/L — SIGNIFICANT CHANGE UP (ref 135–145)
WBC # BLD: 11.7 K/UL — HIGH (ref 3.8–10.5)
WBC # FLD AUTO: 11.7 K/UL — HIGH (ref 3.8–10.5)

## 2017-07-22 RX ORDER — POTASSIUM CHLORIDE 20 MEQ
20 PACKET (EA) ORAL ONCE
Qty: 0 | Refills: 0 | Status: COMPLETED | OUTPATIENT
Start: 2017-07-22 | End: 2017-07-22

## 2017-07-22 RX ADMIN — Medication 20 MILLIEQUIVALENT(S): at 10:59

## 2017-07-22 RX ADMIN — Medication 400 MILLIGRAM(S): at 06:45

## 2017-07-22 RX ADMIN — Medication 400 MILLIGRAM(S): at 01:00

## 2017-07-22 RX ADMIN — MEROPENEM 100 MILLIGRAM(S): 1 INJECTION INTRAVENOUS at 16:59

## 2017-07-22 RX ADMIN — OXYCODONE AND ACETAMINOPHEN 1 TABLET(S): 5; 325 TABLET ORAL at 11:23

## 2017-07-22 RX ADMIN — Medication 400 MILLIGRAM(S): at 17:55

## 2017-07-22 RX ADMIN — OXYCODONE AND ACETAMINOPHEN 1 TABLET(S): 5; 325 TABLET ORAL at 11:00

## 2017-07-22 RX ADMIN — Medication 400 MILLIGRAM(S): at 17:00

## 2017-07-22 RX ADMIN — MEROPENEM 100 MILLIGRAM(S): 1 INJECTION INTRAVENOUS at 09:33

## 2017-07-22 RX ADMIN — OXYCODONE AND ACETAMINOPHEN 1 TABLET(S): 5; 325 TABLET ORAL at 01:05

## 2017-07-22 RX ADMIN — Medication 250 MILLIGRAM(S): at 17:00

## 2017-07-22 RX ADMIN — Medication 250 MILLIGRAM(S): at 05:00

## 2017-07-22 RX ADMIN — Medication 400 MILLIGRAM(S): at 11:22

## 2017-07-22 RX ADMIN — Medication 400 MILLIGRAM(S): at 00:21

## 2017-07-22 RX ADMIN — Medication 400 MILLIGRAM(S): at 05:00

## 2017-07-22 RX ADMIN — OXYCODONE AND ACETAMINOPHEN 1 TABLET(S): 5; 325 TABLET ORAL at 23:11

## 2017-07-22 RX ADMIN — HEPARIN SODIUM 5000 UNIT(S): 5000 INJECTION INTRAVENOUS; SUBCUTANEOUS at 05:00

## 2017-07-22 RX ADMIN — Medication 400 MILLIGRAM(S): at 11:00

## 2017-07-22 RX ADMIN — OXYCODONE AND ACETAMINOPHEN 1 TABLET(S): 5; 325 TABLET ORAL at 00:21

## 2017-07-22 RX ADMIN — Medication 400 MILLIGRAM(S): at 23:12

## 2017-07-22 RX ADMIN — HEPARIN SODIUM 5000 UNIT(S): 5000 INJECTION INTRAVENOUS; SUBCUTANEOUS at 17:00

## 2017-07-22 RX ADMIN — OXYCODONE AND ACETAMINOPHEN 1 TABLET(S): 5; 325 TABLET ORAL at 16:59

## 2017-07-22 RX ADMIN — OXYCODONE AND ACETAMINOPHEN 1 TABLET(S): 5; 325 TABLET ORAL at 17:55

## 2017-07-22 NOTE — PROGRESS NOTE ADULT - SUBJECTIVE AND OBJECTIVE BOX
Interval Events:  Patient seen and examined at bedside. No events overnight    MEDICATIONS:  Pulmonary:    Antimicrobials:  meropenem IVPB 1000 milliGRAM(s) IV Intermittent every 8 hours  vancomycin  IVPB 1750 milliGRAM(s) IV Intermittent every 12 hours    Anticoagulants:  heparin  Injectable 5000 Unit(s) SubCutaneous every 12 hours    Cardiac:    Endocrine:    Allergies    No Known Allergies    Intolerances        Vital Signs Last 24 Hrs  T(C): 36.9 (2017 05:38), Max: 37.3 (2017 17:17)  T(F): 98.4 (2017 05:38), Max: 99.1 (2017 17:17)  HR: 84 (2017 05:38) (72 - 96)  BP: 121/72 (2017 05:38) (121/72 - 137/77)  BP(mean): --  RR: 17 (2017 05:38) (16 - 20)  SpO2: 98% (2017 05:38) (94% - 99%)     @ 07:  -   @ 07:00  --------------------------------------------------------  IN: 1320 mL / OUT: 3700 mL / NET: -2380 mL     @ 07: @ 06:35  --------------------------------------------------------  IN: 1970 mL / OUT: 5500 mL / NET: -3530 mL          LABS:      CBC Full  -  ( 2017 07:13 )  WBC Count : 12.5 K/uL  Hemoglobin : 13.1 g/dL  Hematocrit : 40.2 %  Platelet Count - Automated : 228 K/uL  Mean Cell Volume : 87.4 fL  Mean Cell Hemoglobin : 28.5 pg  Mean Cell Hemoglobin Concentration : 32.6 g/dL  Auto Neutrophil # : x  Auto Lymphocyte # : x  Auto Monocyte # : x  Auto Eosinophil # : x  Auto Basophil # : x  Auto Neutrophil % : 57.0 %  Auto Lymphocyte % : 24.0 %  Auto Monocyte % : 3.0 %  Auto Eosinophil % : 5.0 %  Auto Basophil % : 1.0 %        135  |  98  |  6<L>  ----------------------------<  114<H>  4.2   |  26  |  0.60    Ca    8.7      2017 07:13  Phos  3.5       Mg     2.1                 Urinalysis Basic - ( 2017 15:05 )    Color: Yellow / Appearance: Clear / S.010 / pH: x  Gluc: x / Ketone: NEGATIVE  / Bili: NEGATIVE / Urobili: 1.0 E.U./dL   Blood: x / Protein: NEGATIVE mg/dL / Nitrite: NEGATIVE   Leuk Esterase: NEGATIVE / RBC: < 5 /HPF / WBC < 5 /HPF   Sq Epi: x / Non Sq Epi: Rare /HPF / Bacteria: Present /HPF                RADIOLOGY & ADDITIONAL STUDIES (The following images were personally reviewed):

## 2017-07-22 NOTE — PROGRESS NOTE ADULT - ASSESSMENT
Gen- Awake and alert, NAD  Abd- soft, nontender, nondistended  - jose in place and functional, urine clear, Wound-dressed

## 2017-07-22 NOTE — PROGRESS NOTE ADULT - PROBLEM SELECTOR PLAN 1
-Continue with joes  -f/u am labs  -continue Abx, Vanc trough 7/23 am  -scrotal support  -Diet- Reg  -Amb as tolerated

## 2017-07-23 LAB
ANION GAP SERPL CALC-SCNC: 11 MMOL/L — SIGNIFICANT CHANGE UP (ref 5–17)
BUN SERPL-MCNC: 10 MG/DL — SIGNIFICANT CHANGE UP (ref 7–23)
CALCIUM SERPL-MCNC: 8.9 MG/DL — SIGNIFICANT CHANGE UP (ref 8.4–10.5)
CHLORIDE SERPL-SCNC: 98 MMOL/L — SIGNIFICANT CHANGE UP (ref 96–108)
CO2 SERPL-SCNC: 28 MMOL/L — SIGNIFICANT CHANGE UP (ref 22–31)
CREAT SERPL-MCNC: 0.6 MG/DL — SIGNIFICANT CHANGE UP (ref 0.5–1.3)
CULTURE RESULTS: NO GROWTH — SIGNIFICANT CHANGE UP
CULTURE RESULTS: SIGNIFICANT CHANGE UP
EOSINOPHIL NFR BLD AUTO: 4 % — SIGNIFICANT CHANGE UP (ref 0–6)
GLUCOSE SERPL-MCNC: 94 MG/DL — SIGNIFICANT CHANGE UP (ref 70–99)
HCT VFR BLD CALC: 38.6 % — LOW (ref 39–50)
HGB BLD-MCNC: 12.6 G/DL — LOW (ref 13–17)
LYMPHOCYTES # BLD AUTO: 31 % — SIGNIFICANT CHANGE UP (ref 13–44)
MAGNESIUM SERPL-MCNC: 2.2 MG/DL — SIGNIFICANT CHANGE UP (ref 1.6–2.6)
MCHC RBC-ENTMCNC: 28.8 PG — SIGNIFICANT CHANGE UP (ref 27–34)
MCHC RBC-ENTMCNC: 32.6 G/DL — SIGNIFICANT CHANGE UP (ref 32–36)
MCV RBC AUTO: 88.1 FL — SIGNIFICANT CHANGE UP (ref 80–100)
MONOCYTES NFR BLD AUTO: 14 % — SIGNIFICANT CHANGE UP (ref 2–14)
NEUTROPHILS NFR BLD AUTO: 45 % — SIGNIFICANT CHANGE UP (ref 43–77)
PHOSPHATE SERPL-MCNC: 3.9 MG/DL — SIGNIFICANT CHANGE UP (ref 2.5–4.5)
PLATELET # BLD AUTO: 321 K/UL — SIGNIFICANT CHANGE UP (ref 150–400)
POTASSIUM SERPL-MCNC: 4.4 MMOL/L — SIGNIFICANT CHANGE UP (ref 3.5–5.3)
POTASSIUM SERPL-SCNC: 4.4 MMOL/L — SIGNIFICANT CHANGE UP (ref 3.5–5.3)
RBC # BLD: 4.38 M/UL — SIGNIFICANT CHANGE UP (ref 4.2–5.8)
RBC # FLD: 14.2 % — SIGNIFICANT CHANGE UP (ref 10.3–16.9)
SODIUM SERPL-SCNC: 137 MMOL/L — SIGNIFICANT CHANGE UP (ref 135–145)
SPECIMEN SOURCE: SIGNIFICANT CHANGE UP
SPECIMEN SOURCE: SIGNIFICANT CHANGE UP
VANCOMYCIN TROUGH SERPL-MCNC: 12.5 UG/ML — SIGNIFICANT CHANGE UP (ref 10–20)
WBC # BLD: 10.4 K/UL — SIGNIFICANT CHANGE UP (ref 3.8–10.5)
WBC # FLD AUTO: 10.4 K/UL — SIGNIFICANT CHANGE UP (ref 3.8–10.5)

## 2017-07-23 RX ORDER — OXYCODONE AND ACETAMINOPHEN 5; 325 MG/1; MG/1
1 TABLET ORAL EVERY 4 HOURS
Qty: 0 | Refills: 0 | Status: DISCONTINUED | OUTPATIENT
Start: 2017-07-23 | End: 2017-07-29

## 2017-07-23 RX ORDER — OXYCODONE AND ACETAMINOPHEN 5; 325 MG/1; MG/1
2 TABLET ORAL EVERY 4 HOURS
Qty: 0 | Refills: 0 | Status: DISCONTINUED | OUTPATIENT
Start: 2017-07-23 | End: 2017-07-27

## 2017-07-23 RX ADMIN — Medication 250 MILLIGRAM(S): at 06:47

## 2017-07-23 RX ADMIN — MEROPENEM 100 MILLIGRAM(S): 1 INJECTION INTRAVENOUS at 11:42

## 2017-07-23 RX ADMIN — MEROPENEM 100 MILLIGRAM(S): 1 INJECTION INTRAVENOUS at 17:07

## 2017-07-23 RX ADMIN — OXYCODONE AND ACETAMINOPHEN 2 TABLET(S): 5; 325 TABLET ORAL at 16:21

## 2017-07-23 RX ADMIN — Medication 400 MILLIGRAM(S): at 14:30

## 2017-07-23 RX ADMIN — Medication 400 MILLIGRAM(S): at 17:55

## 2017-07-23 RX ADMIN — OXYCODONE AND ACETAMINOPHEN 2 TABLET(S): 5; 325 TABLET ORAL at 15:24

## 2017-07-23 RX ADMIN — HEPARIN SODIUM 5000 UNIT(S): 5000 INJECTION INTRAVENOUS; SUBCUTANEOUS at 17:07

## 2017-07-23 RX ADMIN — Medication 250 MILLIGRAM(S): at 18:42

## 2017-07-23 RX ADMIN — Medication 400 MILLIGRAM(S): at 12:41

## 2017-07-23 RX ADMIN — HEPARIN SODIUM 5000 UNIT(S): 5000 INJECTION INTRAVENOUS; SUBCUTANEOUS at 06:47

## 2017-07-23 RX ADMIN — Medication 400 MILLIGRAM(S): at 00:00

## 2017-07-23 RX ADMIN — MEROPENEM 100 MILLIGRAM(S): 1 INJECTION INTRAVENOUS at 01:17

## 2017-07-23 RX ADMIN — Medication 400 MILLIGRAM(S): at 06:47

## 2017-07-23 RX ADMIN — Medication 400 MILLIGRAM(S): at 09:00

## 2017-07-23 RX ADMIN — Medication 400 MILLIGRAM(S): at 17:07

## 2017-07-23 NOTE — PROGRESS NOTE ADULT - PROBLEM SELECTOR PLAN 1
-continue same  -f/u am labs  -continue vancomycin  -diet- regular  -ambulate as tolerated -continue scrotal support  -Keep jose monotor output  -VT this morning 12.5 will continue vancomycin  -diet- regular  -ambulate as tolerated

## 2017-07-23 NOTE — PROGRESS NOTE ADULT - ASSESSMENT
Gen- Awake and alert, NAD  Abd-soft, nontender, nondistended  - jose in place and functional, Urine- clear, wound- dressed

## 2017-07-23 NOTE — PROGRESS NOTE ADULT - SUBJECTIVE AND OBJECTIVE BOX
Interval Events:  Patient seen and examined at bedside.    MEDICATIONS:  Pulmonary:    Antimicrobials:  meropenem IVPB 1000 milliGRAM(s) IV Intermittent every 8 hours  vancomycin  IVPB 1750 milliGRAM(s) IV Intermittent every 12 hours    Anticoagulants:  heparin  Injectable 5000 Unit(s) SubCutaneous every 12 hours    Cardiac:    Endocrine:    Allergies    No Known Allergies    Intolerances        Vital Signs Last 24 Hrs  T(C): 36.7 (2017 22:00), Max: 37.2 (2017 14:04)  T(F): 98.1 (2017 22:00), Max: 98.9 (2017 14:04)  HR: 85 (2017 22:00) (84 - 87)  BP: 115/53 (2017 22:00) (115/53 - 126/74)  BP(mean): --  RR: 16 (2017 22:00) (16 - 17)  SpO2: 98% (2017 22:00) (95% - 98%)     @ 07:  -   @ 07:00  --------------------------------------------------------  IN: 1970 mL / OUT: 5500 mL / NET: -3530 mL     @ 07:01  -  23 @ 05:26  --------------------------------------------------------  IN: 1480 mL / OUT: 6300 mL / NET: -4820 mL          LABS:      CBC Full  -  ( 2017 08:31 )  WBC Count : 11.7 K/uL  Hemoglobin : 12.4 g/dL  Hematocrit : 38.5 %  Platelet Count - Automated : 271 K/uL  Mean Cell Volume : 88.1 fL  Mean Cell Hemoglobin : 28.4 pg  Mean Cell Hemoglobin Concentration : 32.2 g/dL  Auto Neutrophil # : x  Auto Lymphocyte # : x  Auto Monocyte # : x  Auto Eosinophil # : x  Auto Basophil # : x  Auto Neutrophil % : 62.6 %  Auto Lymphocyte % : 22.1 %  Auto Monocyte % : 11.8 %  Auto Eosinophil % : 3.0 %  Auto Basophil % : 0.5 %        137  |  98  |  6<L>  ----------------------------<  137<H>  3.8   |  26  |  0.60    Ca    8.5      2017 08:31  Phos  3.3       Mg     2.2                 Urinalysis Basic - ( 2017 15:05 )    Color: Yellow / Appearance: Clear / S.010 / pH: x  Gluc: x / Ketone: NEGATIVE  / Bili: NEGATIVE / Urobili: 1.0 E.U./dL   Blood: x / Protein: NEGATIVE mg/dL / Nitrite: NEGATIVE   Leuk Esterase: NEGATIVE / RBC: < 5 /HPF / WBC < 5 /HPF   Sq Epi: x / Non Sq Epi: Rare /HPF / Bacteria: Present /HPF                RADIOLOGY & ADDITIONAL STUDIES (The following images were personally reviewed): Interval Events:  Patient seen and examined at bedside. No events overnight    MEDICATIONS:  Pulmonary:    Antimicrobials:  meropenem IVPB 1000 milliGRAM(s) IV Intermittent every 8 hours  vancomycin  IVPB 1750 milliGRAM(s) IV Intermittent every 12 hours    Anticoagulants:  heparin  Injectable 5000 Unit(s) SubCutaneous every 12 hours    Cardiac:    Endocrine:    Allergies    No Known Allergies    Intolerances        Vital Signs Last 24 Hrs  T(C): 36.7 (2017 22:00), Max: 37.2 (2017 14:04)  T(F): 98.1 (2017 22:00), Max: 98.9 (2017 14:04)  HR: 85 (2017 22:00) (84 - 87)  BP: 115/53 (2017 22:00) (115/53 - 126/74)  BP(mean): --  RR: 16 (2017 22:00) (16 - 17)  SpO2: 98% (2017 22:00) (95% - 98%)     @ 07:  -   @ 07:00  --------------------------------------------------------  IN: 1970 mL / OUT: 5500 mL / NET: -3530 mL     @ 07:01  23 @ 05:26  --------------------------------------------------------  IN: 1480 mL / OUT: 6300 mL / NET: -4820 mL          LABS:      CBC Full  -  ( 2017 08:31 )  WBC Count : 11.7 K/uL  Hemoglobin : 12.4 g/dL  Hematocrit : 38.5 %  Platelet Count - Automated : 271 K/uL  Mean Cell Volume : 88.1 fL  Mean Cell Hemoglobin : 28.4 pg  Mean Cell Hemoglobin Concentration : 32.2 g/dL  Auto Neutrophil # : x  Auto Lymphocyte # : x  Auto Monocyte # : x  Auto Eosinophil # : x  Auto Basophil # : x  Auto Neutrophil % : 62.6 %  Auto Lymphocyte % : 22.1 %  Auto Monocyte % : 11.8 %  Auto Eosinophil % : 3.0 %  Auto Basophil % : 0.5 %        137  |  98  |  6<L>  ----------------------------<  137<H>  3.8   |  26  |  0.60    Ca    8.5      2017 08:31  Phos  3.3       Mg     2.2                 Urinalysis Basic - ( 2017 15:05 )    Color: Yellow / Appearance: Clear / S.010 / pH: x  Gluc: x / Ketone: NEGATIVE  / Bili: NEGATIVE / Urobili: 1.0 E.U./dL   Blood: x / Protein: NEGATIVE mg/dL / Nitrite: NEGATIVE   Leuk Esterase: NEGATIVE / RBC: < 5 /HPF / WBC < 5 /HPF   Sq Epi: x / Non Sq Epi: Rare /HPF / Bacteria: Present /HPF                RADIOLOGY & ADDITIONAL STUDIES (The following images were personally reviewed):

## 2017-07-24 LAB
ANION GAP SERPL CALC-SCNC: 12 MMOL/L — SIGNIFICANT CHANGE UP (ref 5–17)
BASOPHILS NFR BLD AUTO: 0.6 % — SIGNIFICANT CHANGE UP (ref 0–2)
BUN SERPL-MCNC: 11 MG/DL — SIGNIFICANT CHANGE UP (ref 7–23)
CALCIUM SERPL-MCNC: 9.1 MG/DL — SIGNIFICANT CHANGE UP (ref 8.4–10.5)
CHLORIDE SERPL-SCNC: 97 MMOL/L — SIGNIFICANT CHANGE UP (ref 96–108)
CO2 SERPL-SCNC: 29 MMOL/L — SIGNIFICANT CHANGE UP (ref 22–31)
CREAT SERPL-MCNC: 0.6 MG/DL — SIGNIFICANT CHANGE UP (ref 0.5–1.3)
EOSINOPHIL NFR BLD AUTO: 2.8 % — SIGNIFICANT CHANGE UP (ref 0–6)
GLUCOSE SERPL-MCNC: 87 MG/DL — SIGNIFICANT CHANGE UP (ref 70–99)
HCT VFR BLD CALC: 40 % — SIGNIFICANT CHANGE UP (ref 39–50)
HGB BLD-MCNC: 13.4 G/DL — SIGNIFICANT CHANGE UP (ref 13–17)
LYMPHOCYTES # BLD AUTO: 27.7 % — SIGNIFICANT CHANGE UP (ref 13–44)
MAGNESIUM SERPL-MCNC: 2.4 MG/DL — SIGNIFICANT CHANGE UP (ref 1.6–2.6)
MCHC RBC-ENTMCNC: 29.1 PG — SIGNIFICANT CHANGE UP (ref 27–34)
MCHC RBC-ENTMCNC: 33.5 G/DL — SIGNIFICANT CHANGE UP (ref 32–36)
MCV RBC AUTO: 87 FL — SIGNIFICANT CHANGE UP (ref 80–100)
MONOCYTES NFR BLD AUTO: 10.5 % — SIGNIFICANT CHANGE UP (ref 2–14)
NEUTROPHILS NFR BLD AUTO: 58.4 % — SIGNIFICANT CHANGE UP (ref 43–77)
PHOSPHATE SERPL-MCNC: 4.3 MG/DL — SIGNIFICANT CHANGE UP (ref 2.5–4.5)
PLATELET # BLD AUTO: 419 K/UL — HIGH (ref 150–400)
POTASSIUM SERPL-MCNC: 4.8 MMOL/L — SIGNIFICANT CHANGE UP (ref 3.5–5.3)
POTASSIUM SERPL-SCNC: 4.8 MMOL/L — SIGNIFICANT CHANGE UP (ref 3.5–5.3)
RBC # BLD: 4.6 M/UL — SIGNIFICANT CHANGE UP (ref 4.2–5.8)
RBC # FLD: 14.2 % — SIGNIFICANT CHANGE UP (ref 10.3–16.9)
SODIUM SERPL-SCNC: 138 MMOL/L — SIGNIFICANT CHANGE UP (ref 135–145)
VANCOMYCIN TROUGH SERPL-MCNC: 10.9 UG/ML — SIGNIFICANT CHANGE UP (ref 10–20)
WBC # BLD: 11.5 K/UL — HIGH (ref 3.8–10.5)
WBC # FLD AUTO: 11.5 K/UL — HIGH (ref 3.8–10.5)

## 2017-07-24 RX ADMIN — MEROPENEM 100 MILLIGRAM(S): 1 INJECTION INTRAVENOUS at 10:08

## 2017-07-24 RX ADMIN — OXYCODONE AND ACETAMINOPHEN 2 TABLET(S): 5; 325 TABLET ORAL at 19:21

## 2017-07-24 RX ADMIN — Medication 400 MILLIGRAM(S): at 00:48

## 2017-07-24 RX ADMIN — OXYCODONE AND ACETAMINOPHEN 2 TABLET(S): 5; 325 TABLET ORAL at 18:51

## 2017-07-24 RX ADMIN — Medication 250 MILLIGRAM(S): at 05:23

## 2017-07-24 RX ADMIN — Medication 400 MILLIGRAM(S): at 00:05

## 2017-07-24 RX ADMIN — OXYCODONE AND ACETAMINOPHEN 2 TABLET(S): 5; 325 TABLET ORAL at 00:05

## 2017-07-24 RX ADMIN — Medication 400 MILLIGRAM(S): at 18:00

## 2017-07-24 RX ADMIN — OXYCODONE AND ACETAMINOPHEN 2 TABLET(S): 5; 325 TABLET ORAL at 10:32

## 2017-07-24 RX ADMIN — MEROPENEM 100 MILLIGRAM(S): 1 INJECTION INTRAVENOUS at 18:13

## 2017-07-24 RX ADMIN — Medication 400 MILLIGRAM(S): at 11:45

## 2017-07-24 RX ADMIN — Medication 400 MILLIGRAM(S): at 17:41

## 2017-07-24 RX ADMIN — Medication 400 MILLIGRAM(S): at 23:11

## 2017-07-24 RX ADMIN — OXYCODONE AND ACETAMINOPHEN 2 TABLET(S): 5; 325 TABLET ORAL at 01:05

## 2017-07-24 RX ADMIN — Medication 400 MILLIGRAM(S): at 05:23

## 2017-07-24 RX ADMIN — OXYCODONE AND ACETAMINOPHEN 2 TABLET(S): 5; 325 TABLET ORAL at 10:02

## 2017-07-24 RX ADMIN — HEPARIN SODIUM 5000 UNIT(S): 5000 INJECTION INTRAVENOUS; SUBCUTANEOUS at 05:23

## 2017-07-24 RX ADMIN — Medication 400 MILLIGRAM(S): at 06:23

## 2017-07-24 RX ADMIN — MEROPENEM 100 MILLIGRAM(S): 1 INJECTION INTRAVENOUS at 03:05

## 2017-07-24 RX ADMIN — Medication 250 MILLIGRAM(S): at 18:13

## 2017-07-24 RX ADMIN — Medication 400 MILLIGRAM(S): at 11:15

## 2017-07-24 RX ADMIN — HEPARIN SODIUM 5000 UNIT(S): 5000 INJECTION INTRAVENOUS; SUBCUTANEOUS at 17:41

## 2017-07-24 NOTE — PROGRESS NOTE ADULT - SUBJECTIVE AND OBJECTIVE BOX
INTERVAL HPI/OVERNIGHT EVENTS:  No acute events overnight.    VITALS:    T(F): 97 (07-24-17 @ 05:56), Max: 99 (07-23-17 @ 16:12)  HR: 81 (07-24-17 @ 05:56) (81 - 93)  BP: 104/65 (07-24-17 @ 05:56) (104/65 - 147/77)  RR: 16 (07-24-17 @ 05:56) (16 - 17)  SpO2: 97% (07-24-17 @ 05:56) (97% - 100%)  Wt(kg): --    I&O's Detail    22 Jul 2017 07:01  -  23 Jul 2017 07:00  --------------------------------------------------------  IN:    Oral Fluid: 1480 mL  Total IN: 1480 mL    OUT:    Indwelling Catheter - Urethral: 7100 mL  Total OUT: 7100 mL    Total NET: -5620 mL      23 Jul 2017 07:01  -  24 Jul 2017 06:33  --------------------------------------------------------  IN:    Oral Fluid: 1920 mL    Solution: 50 mL  Total IN: 1970 mL    OUT:    Indwelling Catheter - Urethral: 7450 mL  Total OUT: 7450 mL    Total NET: -5480 mL          MEDICATIONS:    ANTIBIOTICS:  meropenem IVPB 1000 milliGRAM(s) IV Intermittent every 8 hours  vancomycin  IVPB 1750 milliGRAM(s) IV Intermittent every 12 hours      PAIN CONTROL:  acetaminophen   Tablet 650 milliGRAM(s) Oral every 6 hours PRN  acetaminophen   Tablet. 650 milliGRAM(s) Oral every 6 hours PRN  ondansetron Injectable 4 milliGRAM(s) IV Push every 6 hours PRN  ibuprofen  Tablet 400 milliGRAM(s) Oral every 6 hours  oxyCODONE    5 mG/acetaminophen 325 mG 1 Tablet(s) Oral every 4 hours PRN  oxyCODONE    5 mG/acetaminophen 325 mG 2 Tablet(s) Oral every 4 hours PRN       MEDS:      HEME/ONC  heparin  Injectable 5000 Unit(s) SubCutaneous every 12 hours        PHYSICAL EXAM:  General: No acute distress.  Alert and Oriented  Abdominal Exam: soft, NT, ND   Exam: +erect penis with jose intact facing upwards, urine clear, less TTP penis      LABS:                        12.6   10.4  )-----------( 321      ( 23 Jul 2017 06:20 )             38.6     07-23    137  |  98  |  10  ----------------------------<  94  4.4   |  28  |  0.60    Ca    8.9      23 Jul 2017 06:20  Phos  3.9     07-23  Mg     2.2     07-23            RADIOLOGY & ADDITIONAL TESTS:

## 2017-07-24 NOTE — PROGRESS NOTE ADULT - ASSESSMENT
35 yo male s/p corpora cavernosa shunt, urethral injury 7/13/17; ESBL ecoli urine and blood cultures

## 2017-07-25 PROCEDURE — 99233 SBSQ HOSP IP/OBS HIGH 50: CPT

## 2017-07-25 PROCEDURE — 36569 INSJ PICC 5 YR+ W/O IMAGING: CPT

## 2017-07-25 PROCEDURE — 71010: CPT | Mod: 26

## 2017-07-25 RX ORDER — SODIUM CHLORIDE 9 MG/ML
10 INJECTION INTRAMUSCULAR; INTRAVENOUS; SUBCUTANEOUS EVERY 12 HOURS
Qty: 0 | Refills: 0 | Status: DISCONTINUED | OUTPATIENT
Start: 2017-07-25 | End: 2017-07-30

## 2017-07-25 RX ORDER — SODIUM CHLORIDE 9 MG/ML
10 INJECTION INTRAMUSCULAR; INTRAVENOUS; SUBCUTANEOUS
Qty: 0 | Refills: 0 | Status: DISCONTINUED | OUTPATIENT
Start: 2017-07-25 | End: 2017-07-30

## 2017-07-25 RX ORDER — SODIUM CHLORIDE 9 MG/ML
20 INJECTION INTRAMUSCULAR; INTRAVENOUS; SUBCUTANEOUS ONCE
Qty: 0 | Refills: 0 | Status: DISCONTINUED | OUTPATIENT
Start: 2017-07-25 | End: 2017-07-30

## 2017-07-25 RX ADMIN — Medication 400 MILLIGRAM(S): at 11:17

## 2017-07-25 RX ADMIN — Medication 400 MILLIGRAM(S): at 00:11

## 2017-07-25 RX ADMIN — OXYCODONE AND ACETAMINOPHEN 2 TABLET(S): 5; 325 TABLET ORAL at 11:50

## 2017-07-25 RX ADMIN — OXYCODONE AND ACETAMINOPHEN 2 TABLET(S): 5; 325 TABLET ORAL at 02:02

## 2017-07-25 RX ADMIN — Medication 400 MILLIGRAM(S): at 12:36

## 2017-07-25 RX ADMIN — OXYCODONE AND ACETAMINOPHEN 2 TABLET(S): 5; 325 TABLET ORAL at 11:17

## 2017-07-25 RX ADMIN — OXYCODONE AND ACETAMINOPHEN 2 TABLET(S): 5; 325 TABLET ORAL at 01:02

## 2017-07-25 RX ADMIN — Medication 250 MILLIGRAM(S): at 05:38

## 2017-07-25 RX ADMIN — OXYCODONE AND ACETAMINOPHEN 2 TABLET(S): 5; 325 TABLET ORAL at 18:15

## 2017-07-25 RX ADMIN — Medication 400 MILLIGRAM(S): at 06:54

## 2017-07-25 RX ADMIN — MEROPENEM 100 MILLIGRAM(S): 1 INJECTION INTRAVENOUS at 15:51

## 2017-07-25 RX ADMIN — HEPARIN SODIUM 5000 UNIT(S): 5000 INJECTION INTRAVENOUS; SUBCUTANEOUS at 05:38

## 2017-07-25 RX ADMIN — OXYCODONE AND ACETAMINOPHEN 2 TABLET(S): 5; 325 TABLET ORAL at 17:49

## 2017-07-25 RX ADMIN — MEROPENEM 100 MILLIGRAM(S): 1 INJECTION INTRAVENOUS at 03:24

## 2017-07-25 RX ADMIN — Medication 400 MILLIGRAM(S): at 05:38

## 2017-07-25 NOTE — PROCEDURE NOTE - NSPROCDETAILS_GEN_ALL_CORE
ultrasound assessment/ultrasound guidance/sterile technique, catheter placed/supine position/location identified, draped/prepped, sterile technique used

## 2017-07-25 NOTE — CONSULT NOTE ADULT - SUBJECTIVE AND OBJECTIVE BOX
[facility] Service Consult Note    [facility]:  Priapism: Priapism  ESBL (extended spectrum beta-lactamase) producing bacteria infection: ESBL (extended spectrum beta-lactamase) producing bacteria infection  Adjustment disorder with depressed mood  Penile pain: Penile pain             Diagnosis: bacteremia     Indications for Vascular Access (Check all that apply)  [ X ]  Antibiotic Therapy       Antibiotic Prescribed: vanc                                                                             Expected Duration of Therapy:  7 days             [  ]  IV Hydration  [  ]  Total Parenteral Nutrition  [  ]  Chemotherapy  [  ]  Difficult Venous Access  [  ]  CVP monitoring  [  ]  Medications with high potential for tissue necrosis on extravasation  [  ]  Other    Screening (Check all that apply)  Previous Radiation to chest  [  ] Yes      [X  ]  No  Breast Cancer                          [  ] Left     [  ]  Right    [X  ]  No  Lymph Node Dissection         [  ] Left     [  ]  Right    [ X ]  No  Pacemaker or ICD                   [  ] Left     [  ]  Right    [ X ]  No  Upper Extremity DVT             [  ] Left     [  ]  Right    [ X ]  No  Chronic Kidney Disease         [  ]  Yes     [  X]  No  Hemodialysis                           [  ]  Yes     [ X ]  No  AV Fistula/ Graft                     [  ]  Left    [  ]  Right    [ X ]  No  Temp>101F in past 24 H       [  ]  Yes     [ X ]  No  H/O PICC/Midline                   [  ]  Yes     [ X ]  No      ** IVDA, agrees to stay in hospital for duration of administration of abx thru PICC line  Lab data:                        13.4   11.5  )-----------( 419      ( 24 Jul 2017 07:56 )             40.0     07-24    138  |  97  |  11  ----------------------------<  87  4.8   |  29  |  0.60    Ca    9.1      24 Jul 2017 07:56  Phos  4.3     07-24  Mg     2.4     07-24                I have reviewed the chart, interviewed and examined the patient and determined that this patient:  [  X] Is a candidate for a PICC line  [  ] Is a candidate for a Midline  [  ] Is not a candidate for vascular access device (reason)    Lumens:    [ X ] Single  [  ] Double

## 2017-07-25 NOTE — PROGRESS NOTE BEHAVIORAL HEALTH - RISK ASSESSMENT
Patient is not at acutely elevated risk of danger to self at this time.
Patient is not at acutely elevated risk of danger to self at this time.

## 2017-07-25 NOTE — PROGRESS NOTE ADULT - SUBJECTIVE AND OBJECTIVE BOX
AM NOTE    Patient is a 36y old  Male who presents with a chief complaint of pain, feverish (15 Jul 2017 18:36)     No acute events o/n        Vital Signs Last 24 Hrs  T(C): 36.6 (24 Jul 2017 17:10), Max: 36.7 (24 Jul 2017 12:43)  T(F): 97.9 (24 Jul 2017 17:10), Max: 98.1 (24 Jul 2017 12:43)  HR: 78 (24 Jul 2017 12:43) (78 - 98)  BP: 113/68 (24 Jul 2017 17:10) (104/65 - 126/80)  BP(mean): --  RR: 18 (24 Jul 2017 17:10) (16 - 20)  SpO2: 100% (24 Jul 2017 17:10) (95% - 100%)    I&O's Summary    23 Jul 2017 07:01  -  24 Jul 2017 07:00  --------------------------------------------------------  IN: 1970 mL / OUT: 7450 mL / NET: -5480 mL    24 Jul 2017 07:01  -  25 Jul 2017 05:15  --------------------------------------------------------  IN: 1460 mL / OUT: 5050 mL / NET: -3590 mL        Gen: NAD    Abd: soft, NTND    : +erection, FC intact taped up to abdomen and draining well, penis mildly ttp (improving)                          13.4   11.5  )-----------( 419      ( 24 Jul 2017 07:56 )             40.0     07-24    138  |  97  |  11  ----------------------------<  87  4.8   |  29  |  0.60    Ca    9.1      24 Jul 2017 07:56  Phos  4.3     07-24  Mg     2.4     07-24      cultures    A/P  36M s/p corpora cavernosa shunt 7/13 with +ESBL e Coli infx  -cont abx at ID recs  -monitor UO  -cont scrotal support  -diet: reg  -OOB/IS  -pain meds PRN  -dvt ppx

## 2017-07-25 NOTE — PROGRESS NOTE BEHAVIORAL HEALTH - NSBHADMITCOUNSEL_PSY_A_CORE
supportive psychotherapy/client/family/caregiver education/risk factor reduction/instructions for management, treatment and follow up

## 2017-07-26 DIAGNOSIS — R68.83 CHILLS (WITHOUT FEVER): ICD-10-CM

## 2017-07-26 DIAGNOSIS — N48.89 OTHER SPECIFIED DISORDERS OF PENIS: ICD-10-CM

## 2017-07-26 DIAGNOSIS — R00.0 TACHYCARDIA, UNSPECIFIED: ICD-10-CM

## 2017-07-26 DIAGNOSIS — E86.0 DEHYDRATION: ICD-10-CM

## 2017-07-26 LAB — VANCOMYCIN TROUGH SERPL-MCNC: 13.4 UG/ML — SIGNIFICANT CHANGE UP (ref 10–20)

## 2017-07-26 RX ADMIN — OXYCODONE AND ACETAMINOPHEN 1 TABLET(S): 5; 325 TABLET ORAL at 17:59

## 2017-07-26 RX ADMIN — MEROPENEM 100 MILLIGRAM(S): 1 INJECTION INTRAVENOUS at 23:48

## 2017-07-26 RX ADMIN — Medication 400 MILLIGRAM(S): at 06:46

## 2017-07-26 RX ADMIN — Medication 400 MILLIGRAM(S): at 17:59

## 2017-07-26 RX ADMIN — Medication 400 MILLIGRAM(S): at 00:11

## 2017-07-26 RX ADMIN — OXYCODONE AND ACETAMINOPHEN 2 TABLET(S): 5; 325 TABLET ORAL at 23:49

## 2017-07-26 RX ADMIN — HEPARIN SODIUM 5000 UNIT(S): 5000 INJECTION INTRAVENOUS; SUBCUTANEOUS at 05:19

## 2017-07-26 RX ADMIN — Medication 400 MILLIGRAM(S): at 01:07

## 2017-07-26 RX ADMIN — Medication 400 MILLIGRAM(S): at 05:19

## 2017-07-26 RX ADMIN — OXYCODONE AND ACETAMINOPHEN 2 TABLET(S): 5; 325 TABLET ORAL at 01:07

## 2017-07-26 RX ADMIN — Medication 250 MILLIGRAM(S): at 13:41

## 2017-07-26 RX ADMIN — MEROPENEM 100 MILLIGRAM(S): 1 INJECTION INTRAVENOUS at 00:08

## 2017-07-26 RX ADMIN — OXYCODONE AND ACETAMINOPHEN 2 TABLET(S): 5; 325 TABLET ORAL at 00:28

## 2017-07-26 RX ADMIN — OXYCODONE AND ACETAMINOPHEN 1 TABLET(S): 5; 325 TABLET ORAL at 19:00

## 2017-07-26 RX ADMIN — MEROPENEM 100 MILLIGRAM(S): 1 INJECTION INTRAVENOUS at 16:08

## 2017-07-26 RX ADMIN — OXYCODONE AND ACETAMINOPHEN 1 TABLET(S): 5; 325 TABLET ORAL at 13:40

## 2017-07-26 RX ADMIN — OXYCODONE AND ACETAMINOPHEN 1 TABLET(S): 5; 325 TABLET ORAL at 14:30

## 2017-07-26 RX ADMIN — Medication 400 MILLIGRAM(S): at 17:12

## 2017-07-26 RX ADMIN — Medication 400 MILLIGRAM(S): at 23:48

## 2017-07-26 RX ADMIN — HEPARIN SODIUM 5000 UNIT(S): 5000 INJECTION INTRAVENOUS; SUBCUTANEOUS at 17:12

## 2017-07-26 RX ADMIN — Medication 250 MILLIGRAM(S): at 01:10

## 2017-07-26 RX ADMIN — MEROPENEM 100 MILLIGRAM(S): 1 INJECTION INTRAVENOUS at 07:32

## 2017-07-26 RX ADMIN — SODIUM CHLORIDE 10 MILLILITER(S): 9 INJECTION INTRAMUSCULAR; INTRAVENOUS; SUBCUTANEOUS at 04:30

## 2017-07-26 RX ADMIN — Medication 400 MILLIGRAM(S): at 12:00

## 2017-07-26 RX ADMIN — Medication 400 MILLIGRAM(S): at 12:55

## 2017-07-26 NOTE — PROGRESS NOTE ADULT - SUBJECTIVE AND OBJECTIVE BOX
AM NOTE    Patient is a 36y old  Male who presents with a chief complaint of pain, feverish (15 Jul 2017 18:36)      No acute events o/n      Vital Signs Last 24 Hrs  T(C): 37 (25 Jul 2017 21:00), Max: 37 (25 Jul 2017 21:00)  T(F): 98.6 (25 Jul 2017 21:00), Max: 98.6 (25 Jul 2017 21:00)  HR: 75 (25 Jul 2017 21:00) (69 - 85)  BP: 124/78 (25 Jul 2017 21:00) (109/72 - 124/78)  BP(mean): --  RR: 16 (25 Jul 2017 21:00) (16 - 17)  SpO2: 98% (25 Jul 2017 21:00) (97% - 99%)    I&O's Summary    24 Jul 2017 07:01  -  25 Jul 2017 07:00  --------------------------------------------------------  IN: 1460 mL / OUT: 6650 mL / NET: -5190 mL    25 Jul 2017 07:01  -  26 Jul 2017 04:18  --------------------------------------------------------  IN: 2660 mL / OUT: 4650 mL / NET: -1990 mL        Gen: NAD, +L PICC line intact    Abd: soft, NTND    : +erection, FC intact and draining well, improving ecchymosis to penile shaft, nttp                          13.4   11.5  )-----------( 419      ( 24 Jul 2017 07:56 )             40.0     07-24    138  |  97  |  11  ----------------------------<  87  4.8   |  29  |  0.60    Ca    9.1      24 Jul 2017 07:56  Phos  4.3     07-24  Mg     2.4     07-24      cultures    A/P  36M s/p corpora cavernosal shunt 7/13 represented with fever found to have +ESBL e coli infx  -cont jose, monitor UO  -cont abx  -pain meds PRN  -contact precautions   -OOB/IS  -diet: reg  -dvt ppx  -f/u VT  -labs qod

## 2017-07-27 LAB
ANION GAP SERPL CALC-SCNC: 11 MMOL/L — SIGNIFICANT CHANGE UP (ref 5–17)
BASOPHILS NFR BLD AUTO: 0.3 % — SIGNIFICANT CHANGE UP (ref 0–2)
BUN SERPL-MCNC: 16 MG/DL — SIGNIFICANT CHANGE UP (ref 7–23)
CALCIUM SERPL-MCNC: 9 MG/DL — SIGNIFICANT CHANGE UP (ref 8.4–10.5)
CHLORIDE SERPL-SCNC: 97 MMOL/L — SIGNIFICANT CHANGE UP (ref 96–108)
CO2 SERPL-SCNC: 30 MMOL/L — SIGNIFICANT CHANGE UP (ref 22–31)
CREAT SERPL-MCNC: 0.8 MG/DL — SIGNIFICANT CHANGE UP (ref 0.5–1.3)
EOSINOPHIL NFR BLD AUTO: 3.4 % — SIGNIFICANT CHANGE UP (ref 0–6)
GLUCOSE SERPL-MCNC: 97 MG/DL — SIGNIFICANT CHANGE UP (ref 70–99)
HCT VFR BLD CALC: 35.4 % — LOW (ref 39–50)
HGB BLD-MCNC: 12.2 G/DL — LOW (ref 13–17)
LYMPHOCYTES # BLD AUTO: 37.9 % — SIGNIFICANT CHANGE UP (ref 13–44)
MAGNESIUM SERPL-MCNC: 2.4 MG/DL — SIGNIFICANT CHANGE UP (ref 1.6–2.6)
MCHC RBC-ENTMCNC: 29.8 PG — SIGNIFICANT CHANGE UP (ref 27–34)
MCHC RBC-ENTMCNC: 34.5 G/DL — SIGNIFICANT CHANGE UP (ref 32–36)
MCV RBC AUTO: 86.6 FL — SIGNIFICANT CHANGE UP (ref 80–100)
MONOCYTES NFR BLD AUTO: 13.5 % — SIGNIFICANT CHANGE UP (ref 2–14)
NEUTROPHILS NFR BLD AUTO: 44.9 % — SIGNIFICANT CHANGE UP (ref 43–77)
PHOSPHATE SERPL-MCNC: 4.8 MG/DL — HIGH (ref 2.5–4.5)
PLATELET # BLD AUTO: 441 K/UL — HIGH (ref 150–400)
POTASSIUM SERPL-MCNC: 4.5 MMOL/L — SIGNIFICANT CHANGE UP (ref 3.5–5.3)
POTASSIUM SERPL-SCNC: 4.5 MMOL/L — SIGNIFICANT CHANGE UP (ref 3.5–5.3)
RBC # BLD: 4.09 M/UL — LOW (ref 4.2–5.8)
RBC # FLD: 14.1 % — SIGNIFICANT CHANGE UP (ref 10.3–16.9)
SODIUM SERPL-SCNC: 138 MMOL/L — SIGNIFICANT CHANGE UP (ref 135–145)
VANCOMYCIN TROUGH SERPL-MCNC: 16.2 UG/ML — SIGNIFICANT CHANGE UP (ref 10–20)
WBC # BLD: 6.2 K/UL — SIGNIFICANT CHANGE UP (ref 3.8–10.5)
WBC # FLD AUTO: 6.2 K/UL — SIGNIFICANT CHANGE UP (ref 3.8–10.5)

## 2017-07-27 RX ADMIN — Medication 400 MILLIGRAM(S): at 06:43

## 2017-07-27 RX ADMIN — MEROPENEM 100 MILLIGRAM(S): 1 INJECTION INTRAVENOUS at 15:49

## 2017-07-27 RX ADMIN — Medication 400 MILLIGRAM(S): at 23:58

## 2017-07-27 RX ADMIN — Medication 400 MILLIGRAM(S): at 00:35

## 2017-07-27 RX ADMIN — OXYCODONE AND ACETAMINOPHEN 2 TABLET(S): 5; 325 TABLET ORAL at 10:30

## 2017-07-27 RX ADMIN — OXYCODONE AND ACETAMINOPHEN 1 TABLET(S): 5; 325 TABLET ORAL at 23:10

## 2017-07-27 RX ADMIN — Medication 400 MILLIGRAM(S): at 17:15

## 2017-07-27 RX ADMIN — MEROPENEM 100 MILLIGRAM(S): 1 INJECTION INTRAVENOUS at 07:16

## 2017-07-27 RX ADMIN — Medication 400 MILLIGRAM(S): at 06:09

## 2017-07-27 RX ADMIN — Medication 400 MILLIGRAM(S): at 17:16

## 2017-07-27 RX ADMIN — Medication 400 MILLIGRAM(S): at 12:43

## 2017-07-27 RX ADMIN — OXYCODONE AND ACETAMINOPHEN 2 TABLET(S): 5; 325 TABLET ORAL at 10:00

## 2017-07-27 RX ADMIN — HEPARIN SODIUM 5000 UNIT(S): 5000 INJECTION INTRAVENOUS; SUBCUTANEOUS at 06:09

## 2017-07-27 RX ADMIN — HEPARIN SODIUM 5000 UNIT(S): 5000 INJECTION INTRAVENOUS; SUBCUTANEOUS at 17:15

## 2017-07-27 RX ADMIN — Medication 250 MILLIGRAM(S): at 12:43

## 2017-07-27 RX ADMIN — Medication 250 MILLIGRAM(S): at 00:37

## 2017-07-27 RX ADMIN — MEROPENEM 100 MILLIGRAM(S): 1 INJECTION INTRAVENOUS at 23:59

## 2017-07-27 RX ADMIN — OXYCODONE AND ACETAMINOPHEN 2 TABLET(S): 5; 325 TABLET ORAL at 14:54

## 2017-07-27 RX ADMIN — OXYCODONE AND ACETAMINOPHEN 2 TABLET(S): 5; 325 TABLET ORAL at 00:35

## 2017-07-27 RX ADMIN — OXYCODONE AND ACETAMINOPHEN 2 TABLET(S): 5; 325 TABLET ORAL at 15:20

## 2017-07-27 NOTE — PROGRESS NOTE ADULT - SUBJECTIVE AND OBJECTIVE BOX
INTERVAL HPI/OVERNIGHT EVENTS: patient w/ no o/n events. Still w/ priapism and occasional genital pain. Denies CP, dyspnea, ab pain, n/v, d/c.    VITAL SIGNS:  T(F): 98.6 (07-27-17 @ 17:15)  HR: 76 (07-27-17 @ 17:15)  BP: 114/66 (07-27-17 @ 17:15)  RR: 18 (07-27-17 @ 17:15)  SpO2: 100% (07-27-17 @ 17:15)  Wt(kg): --    PHYSICAL EXAM:    Constitutional: well-developed, well-nourished young male, NAD  HEENT: NCAT, EOMI  Pulm: CTAB  Cardio: RRR, normal s1, s2; no m/g/r  Skin: no rashes   GI: soft, nondistended, nontender  : erect penis w/ indwelling jose in place; no purulence or blood at the meatus, no scrotal tenderness, no lesions appreciated.    Ext: WWP, no LE edema; L arm PICC in place; site clean   Neuro: AAOx3, following commands, answers questions appropriately     MEDICATIONS  (STANDING):  ibuprofen  Tablet 400 milliGRAM(s) Oral every 6 hours  meropenem IVPB 1000 milliGRAM(s) IV Intermittent every 8 hours  heparin  Injectable 5000 Unit(s) SubCutaneous every 12 hours  vancomycin  IVPB 1750 milliGRAM(s) IV Intermittent every 12 hours  sodium chloride 0.9% lock flush 20 milliLiter(s) IV Push once    MEDICATIONS  (PRN):  acetaminophen   Tablet 650 milliGRAM(s) Oral every 6 hours PRN For Temp greater than 38 C (100.4 F)  acetaminophen   Tablet. 650 milliGRAM(s) Oral every 6 hours PRN Mild Pain (1 - 3)  ondansetron Injectable 4 milliGRAM(s) IV Push every 6 hours PRN Nausea and/or Vomiting  lidocaine 2% Gel 1 Application(s) Topical every 3 hours PRN pain  oxyCODONE    5 mG/acetaminophen 325 mG 1 Tablet(s) Oral every 4 hours PRN Moderate Pain (4 - 6)  oxyCODONE    5 mG/acetaminophen 325 mG 2 Tablet(s) Oral every 4 hours PRN Severe Pain (7 - 10)  sodium chloride 0.9% lock flush 10 milliLiter(s) IV Push every 1 hour PRN After each medication administration  sodium chloride 0.9% lock flush 10 milliLiter(s) IV Push every 12 hours PRN Lumen of catheter NOT used      Allergies    No Known Allergies    Intolerances        LABS:                        12.2   6.2   )-----------( 441      ( 27 Jul 2017 06:19 )             35.4     07-27    138  |  97  |  16  ----------------------------<  97  4.5   |  30  |  0.80    Ca    9.0      27 Jul 2017 06:19  Phos  4.8     07-27  Mg     2.4     07-27            RADIOLOGY & ADDITIONAL TESTS:    Culture - Blood (07.18.17 @ 17:52)    Specimen Source: .Blood None    Culture Results:   No growth at 2 days.    Culture - Blood (07.16.17 @ 14:13)    Gram Stain:   Growth in aerobic and anaerobic bottles: Gram Negative Rods  Result called to and read back by_ LUCY Cohen RN  07/17/2017 09:40:27    Specimen Source: .Blood Blood    Organism: Escherichia coli ESBL    Culture Results:   Growth in aerobic and anaerobic bottles: Escherichia coli ESBL  Floor previously notified.    Organism Identification: Escherichia coli ESBL    Method Type: ALTON    Culture - Blood (07.16.17 @ 00:45)    Gram Stain:   Anaerobic Bottle: Gram Negative Rods  Result called to and read back by_ KEVIN Gan RN  07/16/2017 13:23:13  Aerobic Bottle: Gram Negative Rods  Result called to and read back by_ RENAE Woo RN  07/16/2017 20:44:58  ***Blood Panel PCR results on thisspecimen are available  approximately 3 hours after the Gram stain result.***  Gram stain, PCR, and/or culture results may not always  correspond due to difference in methodologies.    -  Escherichia coli: Detec    Specimen Source: .Blood Blood    Organism: Blood Culture PCR    Organism: Escherichia coli ESBL    Culture Results:   Growth in aerobic and anaerobic bottles: Escherichia coli ESBL  See previous culture for susceptibility results    Organism Identification: Blood Culture PCR  Escherichia coli ESBL    Method Type: ALTON    Method Type: PCR    Culture - Urine (07.15.17 @ 18:46)    -  Ampicillin: R >16    -  Ampicillin/Sulbactam: R >16/8    -  Cefazolin: R >16    -  Ceftriaxone: R >32    -  Ciprofloxacin: R >2    -  Nitrofurantoin: S <=32    -  Piperacillin/Tazobactam: R <=16    -  Tobramycin: R >8    -  Amikacin: S <=16    -  Gentamicin: S <=4    -  Imipenem: S <=1    -  Trimethoprim/Sulfamethoxazole: R >2/38    Specimen Source: .Urine Catheterized    Culture Results:   >100,000 CFU/ml Escherichia coli ESBL  Result called to and read back byGilbert Cohen RN  07/17/2017 12:56:02    Organism Identification: Escherichia coli ESBL    Organism: Escherichia coli ESBL    Method Type: ALTON

## 2017-07-27 NOTE — PROGRESS NOTE ADULT - ASSESSMENT
36M with PMHx of illicit drug use, admitted for priapism for over 24 hours after self-injecting Trimix s/p corpora cavernosa shunt on 7/13 then discharge the next day, returned with urosepsis and septicemia 2/2 ESBL E. coli.      #ESBL E. coli urosepsis with septicemia: Urine and blood cultures growing ESBL E. coli; patient currently on Meropenem (7/17 - ) and continued on Vancomycin for possible superimposed soft tissue infection in the setting of manipulation.   - continue abx for total 2weeks; Meropenem 1g Q8hrs and vancomycin q12 (7/17 - 7/30)  - continue Vancomycin 1750mg Q12hrs, may adjust dose based on AM trough if needed  - recommend that patient be continued on IV antibiotics for a total of 2 weeks given the severe infection that he presented with. If patient agreeable, prefer that patient remain in the hospital for the course of his IV antibiotics as he'll have the greatest supervision here. However, if patient wants to be transferred to another facility, then strongly recommend that patient be sent to a facility where he'll have comparable follow up to ensure that his current medical problems (including possible cellulitis and GN bacteremia) are appropriately treated.   - surveillance cultures w/ NGTD

## 2017-07-27 NOTE — PROGRESS NOTE ADULT - SUBJECTIVE AND OBJECTIVE BOX
INTERVAL HPI/OVERNIGHT EVENTS:  No acute events overnight.    VITALS:    T(F): 98 (07-26-17 @ 21:01), Max: 98.4 (07-26-17 @ 14:49)  HR: 81 (07-26-17 @ 21:01) (73 - 85)  BP: 121/80 (07-26-17 @ 21:01) (121/80 - 128/72)  RR: 16 (07-26-17 @ 21:01) (16 - 17)  SpO2: 95% (07-26-17 @ 21:01) (95% - 100%)  Wt(kg): --    I&O's Detail    25 Jul 2017 07:01  -  26 Jul 2017 07:00  --------------------------------------------------------  IN:    Oral Fluid: 2240 mL    Solution: 118 mL    Solution: 500 mL  Total IN: 2858 mL    OUT:    Indwelling Catheter - Urethral: 6550 mL  Total OUT: 6550 mL    Total NET: -3692 mL      26 Jul 2017 07:01  -  27 Jul 2017 05:44  --------------------------------------------------------  IN:    Oral Fluid: 2010 mL    Solution: 100 mL    Solution: 1000 mL  Total IN: 3110 mL    OUT:    Indwelling Catheter - Urethral: 5500 mL  Total OUT: 5500 mL    Total NET: -2390 mL          MEDICATIONS:    ANTIBIOTICS:  meropenem IVPB 1000 milliGRAM(s) IV Intermittent every 8 hours  vancomycin  IVPB 1750 milliGRAM(s) IV Intermittent every 12 hours      PAIN CONTROL:  acetaminophen   Tablet 650 milliGRAM(s) Oral every 6 hours PRN  acetaminophen   Tablet. 650 milliGRAM(s) Oral every 6 hours PRN  ondansetron Injectable 4 milliGRAM(s) IV Push every 6 hours PRN  ibuprofen  Tablet 400 milliGRAM(s) Oral every 6 hours  oxyCODONE    5 mG/acetaminophen 325 mG 1 Tablet(s) Oral every 4 hours PRN  oxyCODONE    5 mG/acetaminophen 325 mG 2 Tablet(s) Oral every 4 hours PRN       MEDS:      HEME/ONC  heparin  Injectable 5000 Unit(s) SubCutaneous every 12 hours        PHYSICAL EXAM:  General: No acute distress.  Alert and Oriented  Abdominal Exam: soft, NT, ND   Exam: +erect penis, jose intact taped on pt's abdomen facing upwards, minimal ecchymosis penile shaft, less ttp, urine clear      LABS:                RADIOLOGY & ADDITIONAL TESTS:

## 2017-07-28 PROCEDURE — 99233 SBSQ HOSP IP/OBS HIGH 50: CPT

## 2017-07-28 RX ADMIN — Medication 650 MILLIGRAM(S): at 04:10

## 2017-07-28 RX ADMIN — Medication 400 MILLIGRAM(S): at 00:52

## 2017-07-28 RX ADMIN — MEROPENEM 100 MILLIGRAM(S): 1 INJECTION INTRAVENOUS at 23:19

## 2017-07-28 RX ADMIN — OXYCODONE AND ACETAMINOPHEN 1 TABLET(S): 5; 325 TABLET ORAL at 15:45

## 2017-07-28 RX ADMIN — Medication 400 MILLIGRAM(S): at 11:57

## 2017-07-28 RX ADMIN — Medication 250 MILLIGRAM(S): at 11:57

## 2017-07-28 RX ADMIN — MEROPENEM 100 MILLIGRAM(S): 1 INJECTION INTRAVENOUS at 07:14

## 2017-07-28 RX ADMIN — OXYCODONE AND ACETAMINOPHEN 1 TABLET(S): 5; 325 TABLET ORAL at 14:50

## 2017-07-28 RX ADMIN — HEPARIN SODIUM 5000 UNIT(S): 5000 INJECTION INTRAVENOUS; SUBCUTANEOUS at 18:29

## 2017-07-28 RX ADMIN — Medication 400 MILLIGRAM(S): at 07:14

## 2017-07-28 RX ADMIN — OXYCODONE AND ACETAMINOPHEN 1 TABLET(S): 5; 325 TABLET ORAL at 00:53

## 2017-07-28 RX ADMIN — Medication 400 MILLIGRAM(S): at 06:11

## 2017-07-28 RX ADMIN — Medication 400 MILLIGRAM(S): at 12:40

## 2017-07-28 RX ADMIN — Medication 250 MILLIGRAM(S): at 00:16

## 2017-07-28 RX ADMIN — Medication 400 MILLIGRAM(S): at 23:17

## 2017-07-28 RX ADMIN — Medication 400 MILLIGRAM(S): at 19:15

## 2017-07-28 RX ADMIN — Medication 250 MILLIGRAM(S): at 23:17

## 2017-07-28 RX ADMIN — HEPARIN SODIUM 5000 UNIT(S): 5000 INJECTION INTRAVENOUS; SUBCUTANEOUS at 06:11

## 2017-07-28 RX ADMIN — Medication 400 MILLIGRAM(S): at 18:29

## 2017-07-28 RX ADMIN — MEROPENEM 100 MILLIGRAM(S): 1 INJECTION INTRAVENOUS at 16:33

## 2017-07-28 RX ADMIN — Medication 400 MILLIGRAM(S): at 23:18

## 2017-07-28 RX ADMIN — Medication 325 MILLIGRAM(S): at 03:11

## 2017-07-28 NOTE — PROGRESS NOTE BEHAVIORAL HEALTH - NSBHFUPINTERVALHXFT_PSY_A_CORE
Patient reports he had a pleasant visit with his parents yesterday.  Anxious about disposition plans and whether he will have PICC placed.  Wondering about when his priapism and pain will resolve and what exactly is etiology of pain.
Patient seen at bedside with psychology intern Modesto Machuca.  Patient reports he is frustrated by stay in hospital.  Listed various complaints about food, IVs, and staff.  Feels uncertainty about plan.
Patient reports feeling excited about impending discharge.  He is anticipating need for follow up, oral antibiotics, etc.  Still has psychiatry clinic referrals for [place].

## 2017-07-28 NOTE — PROGRESS NOTE BEHAVIORAL HEALTH - NSBHCONSULTFOLLOWAFTERCARE_PSY_A_CORE FT
Patient given referral information for [facility], [place], and [facility] psychiatry [facility]
Patient given resources for outaptient psychiatric care in [place]
As above; referrals provided to psychiatry clinics in [place]

## 2017-07-28 NOTE — PROGRESS NOTE BEHAVIORAL HEALTH - SUMMARY
36M history of methamphetamine use disorder, prior outpatient dual diagnosis treatment, reporting frustration and worry in setting of priapism and infection.  Patient has several concrete realistic concerns about his medical condition and plan for treatment, especially considering he is a [place] native with no health insurance.  Patient also at particular risk for feeling abandoned or victimized given his family history and upbringing.  Patient does not meet criteria at this time for major depression and would defer psychopharmacological intervention for now.
36M history of methamphetamine use disorder, prior outpatient dual diagnosis treatment, reporting frustration and worry in setting of priapism and infection.  Patient frustrated at hospital stay, externalizes blame for various inconveniences.  Patient also at particular risk for feeling abandoned or victimized given his family history and upbringing.  Patient does not meet criteria at this time for major depression and would defer psychopharmacological intervention for now.
36M substance use disorder presenting with priapism in setting of IVDU, approaching end of course of IC antibiotics and discharge.  Patient has denied depression and suicidality throughout admission; has reported some frustration related to hospitalization.  Patient has psychiatric referrals given to him by me at prior visit.

## 2017-07-28 NOTE — PROGRESS NOTE ADULT - SUBJECTIVE AND OBJECTIVE BOX
AM NOTE    Patient is a 36y old  Male who presents with a chief complaint of pain, feverish (15 Jul 2017 18:36)      No acute events o/n      Vital Signs Last 24 Hrs  T(C): 36.5 (27 Jul 2017 21:38), Max: 37 (27 Jul 2017 14:04)  T(F): 97.7 (27 Jul 2017 21:38), Max: 98.6 (27 Jul 2017 14:04)  HR: 83 (27 Jul 2017 21:38) (76 - 84)  BP: 109/68 (27 Jul 2017 21:38) (109/68 - 128/76)  BP(mean): --  RR: 18 (27 Jul 2017 21:38) (17 - 18)  SpO2: 100% (27 Jul 2017 21:38) (98% - 100%)    I&O's Summary    26 Jul 2017 07:01  -  27 Jul 2017 07:00  --------------------------------------------------------  IN: 3340 mL / OUT: 7400 mL / NET: -4060 mL    27 Jul 2017 07:01  -  28 Jul 2017 04:41  --------------------------------------------------------  IN: 2130 mL / OUT: 5400 mL / NET: -3270 mL        Gen: NAD    Abd: soft, NTND    : +erection, FC intact and draining clear, NTTP, no lesions                           12.2   6.2   )-----------( 441      ( 27 Jul 2017 06:19 )             35.4     07-27    138  |  97  |  16  ----------------------------<  97  4.5   |  30  |  0.80    Ca    9.0      27 Jul 2017 06:19  Phos  4.8     07-27  Mg     2.4     07-27      cultures    A/P  36M PMH priapism s/p shunt 7/13 with fever/chills found to have +ESBL e Coli +cxs  -cont abx (+PICC)  -cont FC, monitor UO  -cont scrotal support  -dvt ppx  -labs qod  -pain meds PRN

## 2017-07-28 NOTE — PROGRESS NOTE BEHAVIORAL HEALTH - NSBHCONSULTMEDS_PSY_A_CORE FT
1. No indication for psychopharmacological intervention
1. No indication for psychiatric medication at this time
None indicated

## 2017-07-29 LAB
ANION GAP SERPL CALC-SCNC: 12 MMOL/L — SIGNIFICANT CHANGE UP (ref 5–17)
BASOPHILS NFR BLD AUTO: 0.6 % — SIGNIFICANT CHANGE UP (ref 0–2)
BUN SERPL-MCNC: 11 MG/DL — SIGNIFICANT CHANGE UP (ref 7–23)
CALCIUM SERPL-MCNC: 9.1 MG/DL — SIGNIFICANT CHANGE UP (ref 8.4–10.5)
CHLORIDE SERPL-SCNC: 98 MMOL/L — SIGNIFICANT CHANGE UP (ref 96–108)
CO2 SERPL-SCNC: 29 MMOL/L — SIGNIFICANT CHANGE UP (ref 22–31)
CREAT SERPL-MCNC: 0.6 MG/DL — SIGNIFICANT CHANGE UP (ref 0.5–1.3)
EOSINOPHIL NFR BLD AUTO: 2.8 % — SIGNIFICANT CHANGE UP (ref 0–6)
GLUCOSE SERPL-MCNC: 98 MG/DL — SIGNIFICANT CHANGE UP (ref 70–99)
HCT VFR BLD CALC: 36.6 % — LOW (ref 39–50)
HGB BLD-MCNC: 11.8 G/DL — LOW (ref 13–17)
LYMPHOCYTES # BLD AUTO: 29.3 % — SIGNIFICANT CHANGE UP (ref 13–44)
MAGNESIUM SERPL-MCNC: 2.3 MG/DL — SIGNIFICANT CHANGE UP (ref 1.6–2.6)
MCHC RBC-ENTMCNC: 28.7 PG — SIGNIFICANT CHANGE UP (ref 27–34)
MCHC RBC-ENTMCNC: 32.2 G/DL — SIGNIFICANT CHANGE UP (ref 32–36)
MCV RBC AUTO: 89.1 FL — SIGNIFICANT CHANGE UP (ref 80–100)
MONOCYTES NFR BLD AUTO: 15.1 % — HIGH (ref 2–14)
NEUTROPHILS NFR BLD AUTO: 52.2 % — SIGNIFICANT CHANGE UP (ref 43–77)
PHOSPHATE SERPL-MCNC: 2.8 MG/DL — SIGNIFICANT CHANGE UP (ref 2.5–4.5)
PLATELET # BLD AUTO: 463 K/UL — HIGH (ref 150–400)
POTASSIUM SERPL-MCNC: 4.2 MMOL/L — SIGNIFICANT CHANGE UP (ref 3.5–5.3)
POTASSIUM SERPL-SCNC: 4.2 MMOL/L — SIGNIFICANT CHANGE UP (ref 3.5–5.3)
RBC # BLD: 4.11 M/UL — LOW (ref 4.2–5.8)
RBC # FLD: 13.9 % — SIGNIFICANT CHANGE UP (ref 10.3–16.9)
SODIUM SERPL-SCNC: 139 MMOL/L — SIGNIFICANT CHANGE UP (ref 135–145)
VANCOMYCIN TROUGH SERPL-MCNC: 13.6 UG/ML — SIGNIFICANT CHANGE UP (ref 10–20)
WBC # BLD: 5.4 K/UL — SIGNIFICANT CHANGE UP (ref 3.8–10.5)
WBC # FLD AUTO: 5.4 K/UL — SIGNIFICANT CHANGE UP (ref 3.8–10.5)

## 2017-07-29 RX ORDER — DIPHENHYDRAMINE HCL 50 MG
25 CAPSULE ORAL EVERY 6 HOURS
Qty: 0 | Refills: 0 | Status: DISCONTINUED | OUTPATIENT
Start: 2017-07-29 | End: 2017-07-30

## 2017-07-29 RX ORDER — SODIUM,POTASSIUM PHOSPHATES 278-250MG
1 POWDER IN PACKET (EA) ORAL ONCE
Qty: 0 | Refills: 0 | Status: COMPLETED | OUTPATIENT
Start: 2017-07-29 | End: 2017-07-29

## 2017-07-29 RX ADMIN — Medication 400 MILLIGRAM(S): at 17:57

## 2017-07-29 RX ADMIN — HEPARIN SODIUM 5000 UNIT(S): 5000 INJECTION INTRAVENOUS; SUBCUTANEOUS at 06:27

## 2017-07-29 RX ADMIN — Medication 400 MILLIGRAM(S): at 07:12

## 2017-07-29 RX ADMIN — Medication 400 MILLIGRAM(S): at 17:56

## 2017-07-29 RX ADMIN — HEPARIN SODIUM 5000 UNIT(S): 5000 INJECTION INTRAVENOUS; SUBCUTANEOUS at 17:57

## 2017-07-29 RX ADMIN — Medication 250 MILLIGRAM(S): at 15:30

## 2017-07-29 RX ADMIN — Medication 400 MILLIGRAM(S): at 11:48

## 2017-07-29 RX ADMIN — MEROPENEM 100 MILLIGRAM(S): 1 INJECTION INTRAVENOUS at 15:35

## 2017-07-29 RX ADMIN — OXYCODONE AND ACETAMINOPHEN 1 TABLET(S): 5; 325 TABLET ORAL at 00:54

## 2017-07-29 RX ADMIN — Medication 400 MILLIGRAM(S): at 12:00

## 2017-07-29 RX ADMIN — MEROPENEM 100 MILLIGRAM(S): 1 INJECTION INTRAVENOUS at 07:58

## 2017-07-29 RX ADMIN — Medication 1 TABLET(S): at 17:56

## 2017-07-29 RX ADMIN — OXYCODONE AND ACETAMINOPHEN 1 TABLET(S): 5; 325 TABLET ORAL at 01:25

## 2017-07-29 RX ADMIN — Medication 400 MILLIGRAM(S): at 06:27

## 2017-07-29 NOTE — PROGRESS NOTE ADULT - PROBLEM SELECTOR PLAN 1
1. Monitor urine output  2. Continue dressing to penis and scrotum   3. Cont abx   4. Pain med   5. d/c IVF   6. OOB/AMB,IS  7. DVT ppx   8. Continue precautions   9. Pain meds PRN  10. Vanc trough today at 12:00 PM

## 2017-07-29 NOTE — PROGRESS NOTE ADULT - SUBJECTIVE AND OBJECTIVE BOX
AM Note    No acute events overnight.      Vital Signs Last 24 Hrs  T(C): 36.7 (07-29-17 @ 05:59), Max: 36.9 (07-28-17 @ 21:00)  T(F): 98 (07-29-17 @ 05:59), Max: 98.5 (07-28-17 @ 21:00)  HR: 69 (07-29-17 @ 05:59) (69 - 85)  BP: 116/73 (07-29-17 @ 05:59) (116/73 - 128/78)  BP(mean): --  RR: 17 (07-29-17 @ 05:59) (17 - 18)  SpO2: 98% (07-29-17 @ 05:59) (98% - 99%)                          12.2   6.2   )-----------( 441      ( 27 Jul 2017 06:19 )             35.4        27 Jul 2017 06:19    138    |  97     |  16     ----------------------------<  97     4.5     |  30     |  0.80     Ca    9.0        27 Jul 2017 06:19  Phos  4.8       27 Jul 2017 06:19  Mg     2.4       27 Jul 2017 06:19          I&O's Summary    27 Jul 2017 07:01  -  28 Jul 2017 07:00  --------------------------------------------------------  IN: 2230 mL / OUT: 5750 mL / NET: -3520 mL    28 Jul 2017 07:01  -  29 Jul 2017 06:13  --------------------------------------------------------  IN: 1940 mL / OUT: 5850 mL / NET: -3910 mL          PHYSICAL EXAM:    GEN:  ABD:  SHAYY:  AM Note  Patient seen at bedside. No pain, fever, chill, n/v, SOB or CP. He feels well and has been ambulating.   No acute events overnight.      Vital Signs Last 24 Hrs  T(C): 36.7 (07-29-17 @ 05:59), Max: 36.9 (07-28-17 @ 21:00)  T(F): 98 (07-29-17 @ 05:59), Max: 98.5 (07-28-17 @ 21:00)  HR: 69 (07-29-17 @ 05:59) (69 - 85)  BP: 116/73 (07-29-17 @ 05:59) (116/73 - 128/78)  BP(mean): --  RR: 17 (07-29-17 @ 05:59) (17 - 18)  SpO2: 98% (07-29-17 @ 05:59) (98% - 99%)                          12.2   6.2   )-----------( 441      ( 27 Jul 2017 06:19 )             35.4        27 Jul 2017 06:19    138    |  97     |  16     ----------------------------<  97     4.5     |  30     |  0.80     Ca    9.0        27 Jul 2017 06:19  Phos  4.8       27 Jul 2017 06:19  Mg     2.4       27 Jul 2017 06:19          I&O's Summary    27 Jul 2017 07:01  -  28 Jul 2017 07:00  --------------------------------------------------------  IN: 2230 mL / OUT: 5750 mL / NET: -3520 mL    28 Jul 2017 07:01  -  29 Jul 2017 06:13  --------------------------------------------------------  IN: 1940 mL / OUT: 5850 mL / NET: -3910 mL          PHYSICAL EXAM:    GEN: NAD  ABD: soft, nt/nd  : +erect penis, jose intact draining yellow urine, minimal ecchymosis penile shaft, less TTP, dressing intact

## 2017-07-30 ENCOUNTER — TRANSCRIPTION ENCOUNTER (OUTPATIENT)
Age: 37
End: 2017-07-30

## 2017-07-30 VITALS
HEART RATE: 93 BPM | RESPIRATION RATE: 17 BRPM | OXYGEN SATURATION: 100 % | TEMPERATURE: 99 F | SYSTOLIC BLOOD PRESSURE: 117 MMHG | DIASTOLIC BLOOD PRESSURE: 73 MMHG

## 2017-07-30 PROCEDURE — 83735 ASSAY OF MAGNESIUM: CPT

## 2017-07-30 PROCEDURE — 36569 INSJ PICC 5 YR+ W/O IMAGING: CPT

## 2017-07-30 PROCEDURE — 87389 HIV-1 AG W/HIV-1&-2 AB AG IA: CPT

## 2017-07-30 PROCEDURE — 83605 ASSAY OF LACTIC ACID: CPT

## 2017-07-30 PROCEDURE — 87040 BLOOD CULTURE FOR BACTERIA: CPT

## 2017-07-30 PROCEDURE — 81001 URINALYSIS AUTO W/SCOPE: CPT

## 2017-07-30 PROCEDURE — 96374 THER/PROPH/DIAG INJ IV PUSH: CPT

## 2017-07-30 PROCEDURE — 80048 BASIC METABOLIC PNL TOTAL CA: CPT

## 2017-07-30 PROCEDURE — 99285 EMERGENCY DEPT VISIT HI MDM: CPT | Mod: 25

## 2017-07-30 PROCEDURE — 85610 PROTHROMBIN TIME: CPT

## 2017-07-30 PROCEDURE — 76937 US GUIDE VASCULAR ACCESS: CPT

## 2017-07-30 PROCEDURE — 71045 X-RAY EXAM CHEST 1 VIEW: CPT

## 2017-07-30 PROCEDURE — 93005 ELECTROCARDIOGRAM TRACING: CPT

## 2017-07-30 PROCEDURE — 87186 SC STD MICRODIL/AGAR DIL: CPT

## 2017-07-30 PROCEDURE — 85027 COMPLETE CBC AUTOMATED: CPT

## 2017-07-30 PROCEDURE — 80307 DRUG TEST PRSMV CHEM ANLYZR: CPT

## 2017-07-30 PROCEDURE — 84100 ASSAY OF PHOSPHORUS: CPT

## 2017-07-30 PROCEDURE — 80053 COMPREHEN METABOLIC PANEL: CPT

## 2017-07-30 PROCEDURE — 36415 COLL VENOUS BLD VENIPUNCTURE: CPT

## 2017-07-30 PROCEDURE — 85730 THROMBOPLASTIN TIME PARTIAL: CPT

## 2017-07-30 PROCEDURE — 96375 TX/PRO/DX INJ NEW DRUG ADDON: CPT

## 2017-07-30 PROCEDURE — 82803 BLOOD GASES ANY COMBINATION: CPT

## 2017-07-30 PROCEDURE — 85025 COMPLETE CBC W/AUTO DIFF WBC: CPT

## 2017-07-30 PROCEDURE — 87086 URINE CULTURE/COLONY COUNT: CPT

## 2017-07-30 PROCEDURE — 80202 ASSAY OF VANCOMYCIN: CPT

## 2017-07-30 RX ORDER — LIDOCAINE 4 G/100G
1 CREAM TOPICAL
Qty: 0 | Refills: 0 | Status: DISCONTINUED | OUTPATIENT
Start: 2017-07-30 | End: 2017-07-30

## 2017-07-30 RX ORDER — LIDOCAINE 4 G/100G
1 CREAM TOPICAL
Qty: 1 | Refills: 1 | OUTPATIENT
Start: 2017-07-30 | End: 2017-08-26

## 2017-07-30 RX ADMIN — MEROPENEM 100 MILLIGRAM(S): 1 INJECTION INTRAVENOUS at 00:36

## 2017-07-30 RX ADMIN — Medication 400 MILLIGRAM(S): at 06:19

## 2017-07-30 RX ADMIN — MEROPENEM 100 MILLIGRAM(S): 1 INJECTION INTRAVENOUS at 07:26

## 2017-07-30 RX ADMIN — Medication 25 MILLIGRAM(S): at 00:35

## 2017-07-30 RX ADMIN — Medication 250 MILLIGRAM(S): at 12:49

## 2017-07-30 RX ADMIN — HEPARIN SODIUM 5000 UNIT(S): 5000 INJECTION INTRAVENOUS; SUBCUTANEOUS at 06:20

## 2017-07-30 RX ADMIN — Medication 400 MILLIGRAM(S): at 07:24

## 2017-07-30 RX ADMIN — Medication 400 MILLIGRAM(S): at 12:30

## 2017-07-30 RX ADMIN — Medication 400 MILLIGRAM(S): at 01:02

## 2017-07-30 RX ADMIN — Medication 400 MILLIGRAM(S): at 00:36

## 2017-07-30 RX ADMIN — Medication 250 MILLIGRAM(S): at 01:53

## 2017-07-30 NOTE — DISCHARGE NOTE ADULT - NS AS ACTIVITY OBS
No Heavy lifting/straining/Walking-Outdoors allowed/Stairs allowed/Showering allowed/Walking-Indoors allowed

## 2017-07-30 NOTE — DISCHARGE NOTE ADULT - HOSPITAL COURSE
36M history illicit drug use, was admitted last week for priapism for over 24 hours. Pt underwent a corpora cavernosa shunt 7/13 and was discharged yesterday. Pt reports he went to his friends house, who he thought would give him money for his prescription. Pt reports he didn't fill the rx for abx since he couldn't afford it. He reports not eating anything and just had some iced tea. Today he reports increased pain with the penis, feeling feverish and chills. Denies other complaints. Jose catheter draining clear.  Patient admitted for pain and fever 101.6F. Cultures blood and urine sent. Cultures from both +ESBL E coli. Placed on isolation. Followed by ID. Patient completed 14 day course of IV meropenum and IV vanco. PICC placed 7/25/17 because of poor IV access. PICC removed prior to discharge. Patient to go home with jose to leg bag. F/U as outpt.

## 2017-07-30 NOTE — DISCHARGE NOTE ADULT - MEDICATION SUMMARY - MEDICATIONS TO TAKE
I will START or STAY ON the medications listed below when I get home from the hospital:    Xylocaine Jelly 2% topical gel with applicator  -- 1 application on skin every 3 hours, As needed, pain MDD:5 applications daily  -- Indication: For Pain with jose catheter

## 2017-07-30 NOTE — DISCHARGE NOTE ADULT - PATIENT PORTAL LINK FT
“You can access the [facility] Patient Portal, offered by [facility], by registering with the following website: [email or web address]”

## 2017-07-30 NOTE — DISCHARGE NOTE ADULT - CARE PLAN
Principal Discharge DX:	ESBL (extended spectrum beta-lactamase) producing bacteria infection  Goal:	ambulation and hydration  Instructions for follow-up, activity and diet:	Completed 14 day course IV antibiotics.   Call MD for increase abdominal/penile pain, nausea, vomiting, temperature >100.5F, or if joes catheter/leg bag not draining well.  Secondary Diagnosis:	Priapism  Instructions for follow-up, activity and diet:	Jose catheter/leg bag care as instructed by nurses. Principal Discharge DX:	ESBL (extended spectrum beta-lactamase) producing bacteria infection  Goal:	ambulation and hydration  Instructions for follow-up, activity and diet:	Completed 14 day course IV antibiotics.   Call MD for increase abdominal/penile pain, nausea, vomiting, temperature >100.5F, or if jose catheter/leg bag not draining well.  Secondary Diagnosis:	Priapism  Instructions for follow-up, activity and diet:	Jose catheter/leg bag care as instructed by nurses.

## 2017-07-30 NOTE — PROGRESS NOTE ADULT - PROBLEM SELECTOR PROBLEM 1
Priapism
ESBL (extended spectrum beta-lactamase) producing bacteria infection
ESBL (extended spectrum beta-lactamase) producing bacteria infection
Priapism

## 2017-07-30 NOTE — PROGRESS NOTE ADULT - SUBJECTIVE AND OBJECTIVE BOX
AM Note    No acute events overnight.      Vital Signs Last 24 Hrs  T(C): 36.6 (07-30-17 @ 06:31), Max: 37.1 (07-29-17 @ 21:00)  T(F): 97.8 (07-30-17 @ 06:31), Max: 98.7 (07-29-17 @ 21:00)  HR: 74 (07-30-17 @ 06:31) (72 - 85)  BP: 116/62 (07-30-17 @ 06:31) (107/68 - 134/76)  BP(mean): --  RR: 16 (07-30-17 @ 06:31) (16 - 19)  SpO2: 98% (07-30-17 @ 06:31) (96% - 100%)                          11.8   5.4   )-----------( 463      ( 29 Jul 2017 10:48 )             36.6        29 Jul 2017 10:48    139    |  98     |  11     ----------------------------<  98     4.2     |  29     |  0.60     Ca    9.1        29 Jul 2017 10:48  Phos  2.8       29 Jul 2017 10:48  Mg     2.3       29 Jul 2017 10:48          I&O's Summary    28 Jul 2017 07:01  -  29 Jul 2017 07:00  --------------------------------------------------------  IN: 1940 mL / OUT: 5850 mL / NET: -3910 mL    29 Jul 2017 07:01  -  30 Jul 2017 06:33  --------------------------------------------------------  IN: 3220 mL / OUT: 6200 mL / NET: -2980 mL          PHYSICAL EXAM:    GEN:  ABD:  SHAYY:  AM Note    No acute events overnight.      Vital Signs Last 24 Hrs  T(C): 36.6 (07-30-17 @ 06:31), Max: 37.1 (07-29-17 @ 21:00)  T(F): 97.8 (07-30-17 @ 06:31), Max: 98.7 (07-29-17 @ 21:00)  HR: 74 (07-30-17 @ 06:31) (72 - 85)  BP: 116/62 (07-30-17 @ 06:31) (107/68 - 134/76)  BP(mean): --  RR: 16 (07-30-17 @ 06:31) (16 - 19)  SpO2: 98% (07-30-17 @ 06:31) (96% - 100%)                          11.8   5.4   )-----------( 463      ( 29 Jul 2017 10:48 )             36.6        29 Jul 2017 10:48    139    |  98     |  11     ----------------------------<  98     4.2     |  29     |  0.60     Ca    9.1        29 Jul 2017 10:48  Phos  2.8       29 Jul 2017 10:48  Mg     2.3       29 Jul 2017 10:48          I&O's Summary    28 Jul 2017 07:01  -  29 Jul 2017 07:00  --------------------------------------------------------  IN: 1940 mL / OUT: 5850 mL / NET: -3910 mL    29 Jul 2017 07:01  -  30 Jul 2017 06:33  --------------------------------------------------------  IN: 3220 mL / OUT: 6200 mL / NET: -2980 mL          PHYSICAL EXAM:    GEN: alert and awake  ABD: soft, NT, ND  : FC intact, urine clear, penis erection improved. No bleeding noted from urethra.

## 2017-07-30 NOTE — PROGRESS NOTE ADULT - PROBLEM SELECTOR PLAN 1
1. Monitor urine output  2. Continue dressing to penis and scrotum   3. Cont abx   4. Pain med   5. d/c IVF   6. OOB/AMB,IS  7. DVT ppx   8. Continue precautions   9. Pain meds PRN  10. Vanc trough today at 12:00 PM 1. Monitor urine output  2. Continue dressing to penis and scrotum   3. Cont abx   4. Pain med   5. d/c IVF   6. OOB/AMB,IS  7. DVT ppx   8. Continue precautions   9. Pain meds PRN  10. Vanc trough today at 12:00 PM  11. d/c home today

## 2017-07-30 NOTE — DISCHARGE NOTE ADULT - PLAN OF CARE
ambulation and hydration Completed 14 day course IV antibiotics.   Call MD for increase abdominal/penile pain, nausea, vomiting, temperature >100.5F, or if jose catheter/leg bag not draining well. Condon catheter/leg bag care as instructed by nurses.

## 2017-08-02 DIAGNOSIS — A41.51 SEPSIS DUE TO ESCHERICHIA COLI [E. COLI]: ICD-10-CM

## 2017-08-02 DIAGNOSIS — F14.10 COCAINE ABUSE, UNCOMPLICATED: ICD-10-CM

## 2017-08-02 DIAGNOSIS — Z59.9 PROBLEM RELATED TO HOUSING AND ECONOMIC CIRCUMSTANCES, UNSPECIFIED: ICD-10-CM

## 2017-08-02 DIAGNOSIS — F15.10 OTHER STIMULANT ABUSE, UNCOMPLICATED: ICD-10-CM

## 2017-08-02 DIAGNOSIS — Z81.1 FAMILY HISTORY OF ALCOHOL ABUSE AND DEPENDENCE: ICD-10-CM

## 2017-08-02 DIAGNOSIS — N30.90 CYSTITIS, UNSPECIFIED WITHOUT HEMATURIA: ICD-10-CM

## 2017-08-02 DIAGNOSIS — R19.7 DIARRHEA, UNSPECIFIED: ICD-10-CM

## 2017-08-02 DIAGNOSIS — Z16.12 EXTENDED SPECTRUM BETA LACTAMASE (ESBL) RESISTANCE: ICD-10-CM

## 2017-08-02 DIAGNOSIS — N48.30 PRIAPISM, UNSPECIFIED: ICD-10-CM

## 2017-08-02 DIAGNOSIS — F43.21 ADJUSTMENT DISORDER WITH DEPRESSED MOOD: ICD-10-CM

## 2017-08-02 DIAGNOSIS — Z56.0 UNEMPLOYMENT, UNSPECIFIED: ICD-10-CM

## 2017-08-02 DIAGNOSIS — T85.79XA INFECTION AND INFLAMMATORY REACTION DUE TO OTHER INTERNAL PROSTHETIC DEVICES, IMPLANTS AND GRAFTS, INITIAL ENCOUNTER: ICD-10-CM

## 2017-08-02 SDOH — ECONOMIC STABILITY - INCOME SECURITY: UNEMPLOYMENT, UNSPECIFIED: Z56.0

## 2017-08-02 SDOH — ECONOMIC STABILITY - INCOME SECURITY: PROBLEM RELATED TO HOUSING AND ECONOMIC CIRCUMSTANCES, UNSPECIFIED: Z59.9

## 2017-08-09 ENCOUNTER — TRANSCRIPTION ENCOUNTER (OUTPATIENT)
Age: 37
End: 2017-08-09

## 2017-08-14 ENCOUNTER — TRANSCRIPTION ENCOUNTER (OUTPATIENT)
Age: 37
End: 2017-08-14

## 2017-08-15 ENCOUNTER — APPOINTMENT (OUTPATIENT)
Dept: UROLOGY | Facility: CLINIC | Age: 37
End: 2017-08-15

## 2018-06-12 NOTE — H&P ADULT - REASON FOR ADMISSION
Name_______________________________________Printed:____________________  Date and time of surgery_6/29/18_masc__0800____________________Arrival Time:_0630_______________   1. Do not eat or drink anything after 12 midnight (or____hours) prior to surgery. This includes no water, chewing gum or mints. Endoscopy patients follow your doctors bowel prep instructions,which may include taking part of prep after midnight. 2. Take the following pills with a small sip of water on the morning of surgery____none_________________________________________________   3. Aspirin, Ibuprofen, Advil, Naproxen, Vitamin E and other Anti-inflammatory products should be stopped for 5 days before surgery or as directed by your physician. 4. Check with your Doctor regarding stopping Plavix, Coumadin,Eliquis, Lovenox,Effient,Pradaxa,Xarelto, Fragmin or other blood thinners and follow their instructions. 5. Do not smoke, and do not drink any alcoholic beverages 24 hours prior to surgery. This includes NA Beer. Refrain from the usage of any recreational drugs. 6. You may brush your teeth and gargle the morning of surgery. DO NOT SWALLOW WATER   7. You MUST make arrangements for a responsible adult to stay on site while you are here and take you home after your surgery. You will not be allowed to leave alone or drive yourself home. It is strongly suggested someone stay with you the first 24 hrs. Your surgery will be cancelled if you do not have a ride home. 8. A parent/legal guardian must accompany a child scheduled for surgery and plan to stay at the hospital until the child is discharged. Please do not bring other children with you. 9. Please wear simple, loose fitting clothing to the hospital.  [facility] not bring valuables (money, credit cards, checkbooks, etc.) Do not wear any makeup (including no eye makeup) or nail polish on your fingers or toes. 10. DO NOT wear any jewelry or piercings on day of surgery.   All body piercing jewelry must be removed. 11. If you have ___dentures, they will be removed before going to the OR; we will provide you a container. If you wear ___contact lenses or ___glasses, they will be removed; please bring a case for them. 12. Please see your family doctor/pediatrician for a history & physical and/or concerning medications. Bring any test results/reports from your physician's office. PCP__________________Phone___________H&P Appt. Date________             13 If you  have a Living Will and Durable Power of  for Healthcare, please bring in a copy. 15. Notify your Surgeon if you develop any illness between now and surgery  time, cough, cold, fever, sore throat, nausea, vomiting, etc.  Please notify your surgeon if you experience dizziness, shortness of breath or blurred vision between now & the time of your surgery             15. DO NOT shave your operative site 96 hours prior to surgery. For face & neck surgery, men may use an electric razor 48 hours prior to surgery. 16. Shower the night before surgery with ___Antibacterial soap ___Hibiclens             17. To provide excellent care visitors will be limited to one in the room at any given time. 18.  Please bring picture ID and insurance card. 19.  Visit our web site for additional information:  [email or web address]              20.During flu season no children under the age of 15 are permitted in the hospital for the safety of all patients. 21. If you take a long acting insulin in the evening only  take half of your usual  dose the night  before your procedure              22. If you use a c-pap please bring DOS if staying overnight,             23.For your convenience [facility] has a pharmacy on site to fill your prescriptions.              24. If you use oxygen and have a portable tank please bring it  with you the DOS             25. Bring a complete list of all your medications with name and dose include any supplements. 26. Other__________________________________________   *Please call pre admission testing if you any further questions   Drew [address]    [id number]  [phone]   [address] [place] [address]       All above information reviewed with patient in person or by phone. Patient verbalizes understanding. All questions and concerns addressed.                                                                                                  Patient/Rep____________________                                                                                                                                    PRE OP INSTRUCTIONS priapism

## 2018-08-01 NOTE — ED ADULT TRIAGE NOTE - ACCOMPANIED BY
Observation goals:  - Serial troponins and stress test complete. - Not met, 3 neg trops, but stress test to be completed this AM  - Seen and cleared by consultant if applicable - Not met  - Adequate pain control on oral analgesia - Met  - Vital signs normal or at patient baseline - Met  - Safe disposition plan has been identified - not met   EMT/paramedic

## 2021-11-14 NOTE — ED PROVIDER NOTE - GENITOURINARY BLADDER
[No studies available for review at this time.] : No studies available for review at this time. non-tender

## 2022-04-29 NOTE — ED ADULT NURSE NOTE - ATTEMPT TO OOB
Notify patient labs show an elevation of inflammatory markers Sed Rate and CRP. The other labs are ok so far. This might indicate a condition called polymyalgia rheumatica an inflammatory rheumatic condition of unknown cause. Recommend start prednisone 20 mg po qd. This should relieve symptoms quickly. Let me know in a week how your are feeling.   no

## 2022-07-14 NOTE — PROGRESS NOTE BEHAVIORAL HEALTH - MUSCLE TONE / STRENGTH
all other ROS negative except as per HPI
Normal muscle tone/strength

## 2022-10-04 NOTE — PROVIDER CONTACT NOTE (CRITICAL VALUE NOTIFICATION) - PERSON GIVING RESULT:
Called the pt he sprained his right ankle 8 weeks ago. He tripped over a root while cutting grass, he felt something pop.  He still has pain.  Difficulty walking. He denies swelling. Wearing support stockings.  Pain medial aspect of ankle.  At time of injury he had some swelling but no bruising.    He is interested in seeing Ortho.   Lab

## 2022-12-21 NOTE — ED PROVIDER NOTE - MUSCULOSKELETAL, MLM
Received request via: Pharmacy    Was the patient seen in the last year in this department? Yes    Does the patient have an active prescription (recently filled or refills available) for medication(s) requested? No    Does the patient have snf Plus and need 100 day supply (blood pressure, diabetes and cholesterol meds only)? Patient does not have SCP   Spine appears normal, range of motion is not limited, no muscle or joint tenderness

## 2023-03-02 NOTE — PROVIDER CONTACT NOTE (CRITICAL VALUE NOTIFICATION) - ACTION/TREATMENT ORDERED:
- Continue with routine post operative care plan   - May gradually increase activity and lifting restriction to 20 pounds, as tolerated  - Follow up at 3 months post op with xray prior   - Call our clinic for any incisional concerns, increased pain, new symptoms, or any other post operative questions or concerns   PA already aware. SCOTT Black already aware.

## 2023-09-13 NOTE — PATIENT PROFILE ADULT. - LIVING ARRANGEMENTS, TEMPORARY FAMILY, PROFILE
none required
Render In Strict Bullet Format?: No
Initiate Treatment: doxycycline monohydrate 100 mg capsule \\nQuantity: 60.0 Capsule  Days Supply: 30\\nSig: Take one capsule by mouth twice daily with food and water\\n\\ntretinoin 0.025 % topical cream QHS\\nQuantity: 45.0 g\\nSig: Apply a pea sized amount at bedtime\\n\\nclindamycin 1 % lotion daily\\nQuantity: 60.0 ml\\nSig: apply thin layer to face every morning.
Plan: F/U 6-8 WEEKS\\nON RESERVE: BIRTH CONTROL PILLS, SPIRONOLACTONE OR ACCUTANE
Detail Level: Zone

## 2024-09-28 NOTE — ED ADULT NURSE NOTE - PERSON CONFIRMING BED ASSIGNMENT
verbally reviewed with pt, pt uncooperative group home [facility] Per chart has had a rash in response to Zyprexa and reports dystonic reaction to haldol Patient did not engage in exploration, At first he was stating that he was suicidal,